# Patient Record
Sex: MALE | Race: WHITE | Employment: UNEMPLOYED | URBAN - METROPOLITAN AREA
[De-identification: names, ages, dates, MRNs, and addresses within clinical notes are randomized per-mention and may not be internally consistent; named-entity substitution may affect disease eponyms.]

---

## 2019-05-01 DIAGNOSIS — Z87.81 STATUS POST FRACTURE OF LEFT HIP: ICD-10-CM

## 2019-05-01 DIAGNOSIS — D62 ACUTE POST-HEMORRHAGIC ANEMIA: ICD-10-CM

## 2019-05-01 DIAGNOSIS — Z91.81 HX OF FALL: ICD-10-CM

## 2019-05-01 DIAGNOSIS — R62.50 DEVELOPMENT DELAY: ICD-10-CM

## 2019-05-01 PROBLEM — G40.909 EPILEPSY (HCC): Status: ACTIVE | Noted: 2019-05-01

## 2019-05-01 PROBLEM — K59.00 CONSTIPATION: Status: ACTIVE | Noted: 2019-05-01

## 2019-05-01 PROBLEM — G80.9 CP (CEREBRAL PALSY) (HCC): Status: ACTIVE | Noted: 2019-05-01

## 2019-05-01 RX ORDER — ASCORBIC ACID 500 MG
500 TABLET ORAL DAILY
COMMUNITY
End: 2020-05-11 | Stop reason: SDUPTHER

## 2019-05-01 RX ORDER — LEVETIRACETAM 250 MG/1
500 TABLET ORAL NIGHTLY
COMMUNITY
End: 2020-05-20

## 2019-05-01 RX ORDER — LEVETIRACETAM 250 MG/1
250 TABLET ORAL EVERY MORNING
COMMUNITY
End: 2020-05-11 | Stop reason: SDUPTHER

## 2019-05-01 RX ORDER — M-VIT,TX,IRON,MINS/CALC/FOLIC 27MG-0.4MG
1 TABLET ORAL DAILY
COMMUNITY
End: 2020-05-11 | Stop reason: SDUPTHER

## 2019-05-02 ENCOUNTER — OFFICE VISIT (OUTPATIENT)
Dept: INTERNAL MEDICINE | Age: 34
End: 2019-05-02
Payer: MEDICARE

## 2019-05-02 VITALS
HEART RATE: 88 BPM | DIASTOLIC BLOOD PRESSURE: 80 MMHG | OXYGEN SATURATION: 97 % | SYSTOLIC BLOOD PRESSURE: 134 MMHG | HEIGHT: 66 IN

## 2019-05-02 DIAGNOSIS — H54.8 LEGALLY BLIND: ICD-10-CM

## 2019-05-02 DIAGNOSIS — Z99.3 WHEELCHAIR BOUND: ICD-10-CM

## 2019-05-02 DIAGNOSIS — Z13.220 SCREENING CHOLESTEROL LEVEL: ICD-10-CM

## 2019-05-02 DIAGNOSIS — G80.9 CEREBRAL PALSY, UNSPECIFIED TYPE (HCC): ICD-10-CM

## 2019-05-02 DIAGNOSIS — L21.9 SEBORRHEA: ICD-10-CM

## 2019-05-02 DIAGNOSIS — G40.001 PARTIAL IDIOPATHIC EPILEPSY WITH SEIZURES OF LOCALIZED ONSET, NOT INTRACTABLE, WITH STATUS EPILEPTICUS (HCC): ICD-10-CM

## 2019-05-02 DIAGNOSIS — D62 ACUTE POST-HEMORRHAGIC ANEMIA: ICD-10-CM

## 2019-05-02 DIAGNOSIS — Z76.89 ENCOUNTER TO ESTABLISH CARE: Primary | ICD-10-CM

## 2019-05-02 DIAGNOSIS — Q21.0 VSD (VENTRICULAR SEPTAL DEFECT): ICD-10-CM

## 2019-05-02 DIAGNOSIS — Z91.81 HX OF FALLING: ICD-10-CM

## 2019-05-02 PROCEDURE — G8421 BMI NOT CALCULATED: HCPCS | Performed by: FAMILY MEDICINE

## 2019-05-02 PROCEDURE — 1036F TOBACCO NON-USER: CPT | Performed by: FAMILY MEDICINE

## 2019-05-02 PROCEDURE — 99204 OFFICE O/P NEW MOD 45 MIN: CPT | Performed by: FAMILY MEDICINE

## 2019-05-02 PROCEDURE — G8427 DOCREV CUR MEDS BY ELIG CLIN: HCPCS | Performed by: FAMILY MEDICINE

## 2019-05-02 RX ORDER — KETOCONAZOLE 20 MG/G
CREAM TOPICAL
Qty: 30 G | Refills: 1 | Status: SHIPPED | OUTPATIENT
Start: 2019-05-02 | End: 2019-12-16 | Stop reason: SDUPTHER

## 2019-05-02 ASSESSMENT — PATIENT HEALTH QUESTIONNAIRE - PHQ9
1. LITTLE INTEREST OR PLEASURE IN DOING THINGS: 0
2. FEELING DOWN, DEPRESSED OR HOPELESS: 0
SUM OF ALL RESPONSES TO PHQ QUESTIONS 1-9: 0
SUM OF ALL RESPONSES TO PHQ QUESTIONS 1-9: 0
SUM OF ALL RESPONSES TO PHQ9 QUESTIONS 1 & 2: 0

## 2019-05-02 NOTE — PROGRESS NOTES
Patient: Dawit Flores    YOB: 1985       Patient Active Problem List    Diagnosis Date Noted    Development delay 05/01/2019     Priority: High    Epilepsy ([facility] [address] 05/01/2019     Priority: High    CP (cerebral palsy) ([facility] 05/01/2019     Priority: High    Hx of falling 05/02/2019     Priority: Medium    VSD (ventricular septal defect) 05/02/2019     Priority: Medium     Overview Note:     S/p surgery for repair      Legally blind 05/02/2019     Priority: Medium    Wheelchair bound 05/02/2019     Priority: Medium    Acute post-hemorrhagic anemia 05/01/2019     Priority: Medium    Constipation 05/01/2019     Priority: Low    Status post fracture of left hip 05/01/2019     Priority: Low     Overview Note:     LT Hip Fracture S/P SHO       Past Medical History:   Diagnosis Date    Acute post-hemorrhagic anemia     Constipation     CP (cerebral palsy) (HCC)     Development delay     Epilepsy (HCC)     Hip fracture (HCC)     LT hip Fracture S/P SHO    Hx MRSA infection     Hx of fall     Legally blind      Past Surgical History:   Procedure Laterality Date    CARDIAC SURGERY      open heart D/t VSD    EYE MUSCLE SURGERY      INGUINAL HERNIA REPAIR       No family history on file.   Social History     Socioeconomic History    Marital status: Single     Spouse name: Not on file    Number of children: Not on file    Years of education: Not on file    Highest education level: Not on file   Occupational History    Not on file   Social Needs    Financial resource strain: Not on file    Food insecurity:     Worry: Not on file     Inability: Not on file    Transportation needs:     Medical: Not on file     Non-medical: Not on file   Tobacco Use    Smoking status: Never Smoker    Smokeless tobacco: Never Used   Substance and Sexual Activity    Alcohol use: Not on file    Drug use: Not on file    Sexual activity: Not on file   Lifestyle    Physical activity:     Days per week: Not drainage. Respiratory: Negative for cough, dyspnea and wheezing. Cardiovascular:  Negative for chest pain, claudication and irregular heartbeat/palpitations. Physical Exam  Vitals:    05/02/19 0714   BP: 134/80   Pulse: 88   SpO2: 97%   There is no height or weight on file to calculate BMI. Physical Exam  Constitutional:  Appears well-developed and well-nourished. No distress. HENT:   Head: Normocephalic and atraumatic. Right Ear: External ear normal.   Left Ear: External ear normal.   Nose: Nose normal.   Eyes: Conjunctivae and EOM are normal.  Right eye exhibits no discharge. Left eye exhibits no discharge. No scleral icterus. Neck: Normal range of motion. Cardiovascular: Normal rate, regular rhythm and normal heart sounds. No murmur heard. Pulmonary/Chest: Effort normal and breath sounds normal. No respiratory distress. no wheezes. no rales. no tenderness. Neurological: alert. Skin: not diaphoretic. Psychiatric: normal mood and affect. behavior is normal.   Mild erythema and superficial flaking of skin on forehead  Nursing note and vitals reviewed. Assessment:  Larissa Diaz was seen today for establish care. Diagnoses and all orders for this visit:    Encounter to establish care    Hx of falling  Currently wheelchair bound  Documented needed assistance with transferring    VSD (ventricular septal defect)  -     Ambulatory referral to Cardiology  -     Echocardiogram complete; Future  No symptoms  Referral to cardiology  Echo ordered    Legally blind  Unchanged    Wheelchair bound  Unchanged    Partial idiopathic epilepsy with seizures of localized onset, not intractable, with status epilepticus ([facility] [address]  -     CBC Auto Differential; Future  -     Comprehensive Metabolic Panel; Future  Stable, controlled  Plan: continue current medications    Cerebral palsy, unspecified type ([address]  -     CBC Auto Differential; Future  -     Comprehensive Metabolic Panel;  Future  Unchanged    Acute post-hemorrhagic anemia  -     CBC Auto Differential; Future  Check levels & need for further work up     Screening cholesterol level  -     Lipid Panel; Future    Seborrhea  -     ketoconazole (NIZORAL) 2 % cream; Apply topically daily. hand out provided, had a lengthy discussion with patient about treatment, it's side effects, the diagnosis & etiology of symptoms, along with management and expectations of outcome with the recommended treatment. Patient verbalized understanding.           Orders Placed This Encounter   Procedures    CBC Auto Differential     Standing Status:   Future     Standing Expiration Date:   7/2/2019    Comprehensive Metabolic Panel     Standing Status:   Future     Standing Expiration Date:   7/2/2019    Lipid Panel     Standing Status:   Future     Standing Expiration Date:   5/2/2020     Order Specific Question:   Is Patient Fasting?/# of Hours     Answer:   fasting    Ambulatory referral to Cardiology     Referral Priority:   Routine     Referral Type:   Eval and Treat     Referral Reason:   Specialty Services Required     Referred to Provider:   Bk Hill DO     Requested Specialty:   Cardiology     Number of Visits Requested:   1    Echocardiogram complete     Standing Status:   Future     Standing Expiration Date:   7/1/2019     Order Specific Question:   Reason for exam:     Answer:   hx of VSD      I personally reviewed all recent labs and imaging pertaining to conditions mentioned above in assessment/plan in [facility] and care everywhere, discussed results with patient in office    Total visit time >45 mins, more than 50% time spent on counseling, treatment, side effects, and follow up    Domo Cabral MD

## 2019-05-28 ENCOUNTER — HOSPITAL ENCOUNTER (OUTPATIENT)
Dept: NON INVASIVE DIAGNOSTICS | Age: 34
Discharge: HOME | End: 2019-05-28
Payer: MEDICARE

## 2019-05-28 DIAGNOSIS — Q21.0 VSD (VENTRICULAR SEPTAL DEFECT): ICD-10-CM

## 2019-05-28 LAB
LV EF: 60 %
LVEF MODALITY: NORMAL

## 2019-05-28 PROCEDURE — 93306 TTE W/DOPPLER COMPLETE: CPT

## 2019-05-31 ENCOUNTER — OFFICE VISIT (OUTPATIENT)
Dept: CARDIOLOGY CLINIC | Age: 34
End: 2019-05-31
Payer: MEDICARE

## 2019-05-31 VITALS — SYSTOLIC BLOOD PRESSURE: 120 MMHG | HEART RATE: 99 BPM | DIASTOLIC BLOOD PRESSURE: 70 MMHG

## 2019-05-31 DIAGNOSIS — Q21.0 VSD (VENTRICULAR SEPTAL DEFECT): ICD-10-CM

## 2019-05-31 DIAGNOSIS — Z00.00 PE (PHYSICAL EXAM), ROUTINE: Primary | ICD-10-CM

## 2019-05-31 PROCEDURE — 99203 OFFICE O/P NEW LOW 30 MIN: CPT | Performed by: INTERNAL MEDICINE

## 2019-05-31 PROCEDURE — 93000 ELECTROCARDIOGRAM COMPLETE: CPT | Performed by: INTERNAL MEDICINE

## 2019-05-31 PROCEDURE — G8421 BMI NOT CALCULATED: HCPCS | Performed by: INTERNAL MEDICINE

## 2019-05-31 PROCEDURE — 1036F TOBACCO NON-USER: CPT | Performed by: INTERNAL MEDICINE

## 2019-05-31 PROCEDURE — G8427 DOCREV CUR MEDS BY ELIG CLIN: HCPCS | Performed by: INTERNAL MEDICINE

## 2019-05-31 NOTE — PROGRESS NOTES
Chief Complaint   Patient presents with   Lolis [facility] Establ[facility] Cardiologist     DR Nury Espinoza    Ventricular Septal Defect       5-31-19: Patient presents for initial medical evaluation. Patient is followed on a regular basis by Dr. Yayo Isabel MD. Patient with hx of cerebral palsy. Hx of VSD s/p repair in the past. Pt denies chest pain, dyspnea, dyspnea on exertion, change in exercise capacity, fatigue,  nausea, vomiting, diarrhea, constipation, motor weakness, insomnia, weight loss, syncope, dizziness, lightheadedness, palpitations, PND, orthopnea. EKG with NSR, IRBBB. S/p ECHO on 5-28-19: with EF of 60%, no VSD, mild PI.            Patient Active Problem List   Diagnosis    Development delay    Epilepsy ([facility] [address]    [facility]    Acute post-hemorrhagic anemia    Constipation    Status post fracture of left hip    Hx of falling    VSD (ventricular septal defect)    Legally blind    Wheelchair bound       Past Surgical History:   Procedure Laterality Date    CARDIAC SURGERY      open heart D/t VSD    EYE MUSCLE SURGERY      INGUINAL HERNIA REPAIR         Social History     Socioeconomic History    Marital status: Single     Spouse name: Not on file    Number of children: Not on file    Years of education: Not on file    Highest education level: Not on file   Occupational History    Not on file   Social Needs    Financial resource strain: Not on file    Food insecurity:     Worry: Not on file     Inability: Not on file    Transportation needs:     Medical: Not on file     Non-medical: Not on file   Tobacco Use    Smoking status: Never Smoker    Smokeless tobacco: Never Used   Substance and Sexual Activity    Alcohol use: Not on file    Drug use: Not on file    Sexual activity: Not on file   Lifestyle    Physical activity:     Days per week: Not on file     Minutes per session: Not on file    Stress: Not on file   Relationships    Social connections:     Talks on phone: Not on file     Gets together: Not on file     Attends [employer] service: Not on file     Active member of club or organization: Not on file     Attends meetings of clubs or organizations: Not on file     Relationship status: Not on file    Intimate partner violence:     Fear of current or ex partner: Not on file     Emotionally abused: Not on file     Physically abused: Not on file     Forced sexual activity: Not on file   Other Topics Concern    Not on file   Social History Narrative    Not on file       No family history on file. Current Outpatient Medications   Medication Sig Dispense Refill    ketoconazole (NIZORAL) 2 % cream Apply topically daily. 30 g 1    levETIRAcetam (KEPPRA) 250 MG tablet Take 250 mg by mouth every morning      levETIRAcetam (KEPPRA) 250 MG tablet Take 500 mg by mouth nightly      vitamin C (ASCORBIC ACID) 500 MG tablet Take 500 mg by mouth daily      Multiple Vitamins-Minerals (THERAPEUTIC MULTIVITAMIN-MINERALS) tablet Take 1 tablet by mouth daily       No current facility-administered medications for this visit. Ciprofloxacin    Review of Systems:  General ROS: negative  Psychological ROS: negative  Hematological and Lymphatic ROS: No history of blood clots or bleeding disorder. Respiratory ROS: no cough, shortness of breath, or wheezing  Cardiovascular ROS: no chest pain or dyspnea on exertion  Gastrointestinal ROS: no abdominal pain, change in bowel habits, or black or bloody stools  Genito-Urinary ROS: no dysuria, trouble voiding, or hematuria  Musculoskeletal ROS: negative  Neurological ROS: no TIA or stroke symptoms  Dermatological ROS: negative    VITALS:  Blood pressure 120/70, pulse 99. There is no height or weight on file to calculate BMI. Physical Examination:  General appearance - alert, well appearing, and in no distress  Mental status - alert, oriented to person, place, and time  Neck - Neck is supple, no JVD or carotid bruits.   No thyromegaly or adenopathy. Chest - clear to auscultation, no wheezes, rales or rhonchi, symmetric air entry  Heart - normal rate, regular rhythm, normal S1, S2, no murmurs, rubs, clicks or gallops  Abdomen - soft, nontender, nondistended, no masses or organomegaly  Neurological - alert, oriented, normal speech, no focal findings or movement disorder noted  Extremities - peripheral pulses normal, no pedal edema, no clubbing or cyanosis  Skin - normal coloration and turgor, no rashes, no suspicious skin lesions noted      EKG: normal sinus rhythm, RBBB    Orders Placed This Encounter   Procedures    EKG 12 Lead       ASSESSMENT:     Diagnosis Orders   1. PE (physical exam), routine  EKG 12 Lead   2. VSD (ventricular septal defect)           PLAN:     Patient will need to continue to follow up with you for their general medical care    As always, aggressive risk factor modification is strongly recommended. We should adhere to the JNC VIII guidelines for HTN management and the NCEP ATP III guidelines for LDL-C management. Cardiac diet is always recommended with low fat, cholesterol, calories and sodium. Continue medications at current doses. Check ECHO in future for VSD monitoring    Check EKG    ABX prior to more advanced/invasive  dental procedures.

## 2019-06-04 RX ORDER — LORATADINE 10 MG/1
10 TABLET ORAL DAILY
Qty: 90 TABLET | Refills: 3 | Status: SHIPPED | OUTPATIENT
Start: 2019-06-04 | End: 2019-06-07 | Stop reason: ALTCHOICE

## 2019-06-06 ENCOUNTER — TELEPHONE (OUTPATIENT)
Dept: INTERNAL MEDICINE | Age: 34
End: 2019-06-06

## 2019-06-06 DIAGNOSIS — J34.9 SINUS PROBLEM: Primary | ICD-10-CM

## 2019-06-06 NOTE — TELEPHONE ENCOUNTER
Lewis Hickman called from [facility] stating that she had a message sent to zeyad while you were out. See below. jamar states mom keeps calling because she knows the only thing tht works is benadryl and she dont understand why we didn't call that in to pharmacy alternatives. Shilo Esquivel does state patient has not had an relief. If we order benadryl they need a d/c for claritin. Please advise     Pato Izquierdo MA           12:14 PM   Note      Indu from [address] [place] [address] called to say that pt has been having a dry cough. His mom came today and let Indu know that pt has problems with allergies, but never got relief from any of the otc allergy meds besides benadryl. Would like to know if rx can be sent to mail order pharmacy.                        12:19 PM   Pato Izquierdo MA routed this conversation to Temo Escalante, [address] [place] [address], [place]           1:04 PM   Note      No pharmacy in chart                      1:05 PM   SHANTAL Tyler routed this conversation to [facility] Clinical Staff       Sushma Mcdaniels MA           6:38 PM   Note      surescript requesting medication refill.  Please approve or deny this request.     Rx requested:  Requested Prescriptions             Pending Prescriptions Disp Refills    loratadine (CLARITIN) 10 MG tablet 90 tablet 3       Sig: Take 1 tablet by mouth daily         Last Office Visit:   5/2/2019     Last Labs:  05/02/19     Next Visit Date:         Future Appointments   Date Time Provider Sara Goyal   6/5/2020  7:15 AM DO SILVIO Shaikh [facility] EM[facility]GENC[facility] AT [email or web address]  Called [facility] and [facility] pharmacy

## 2019-06-07 RX ORDER — DIPHENHYDRAMINE HCL 12.5MG/5ML
LIQUID (ML) ORAL 4 TIMES DAILY PRN
Refills: 4 | Status: CANCELLED | OUTPATIENT
Start: 2019-06-07

## 2019-06-07 NOTE — TELEPHONE ENCOUNTER
PH[facility]Y [facility] requesting medication refill.  Please approve or deny this request.    Rx requested:  Requested Prescriptions     Pending Prescriptions Disp Refills    diphenhydrAMINE (BENADRYL) 12.5 MG/5ML elixir  4     Sig: Take by mouth 4 times daily as needed for Allergies         Last Office Visit:   5/2/2019      Next Visit Date:  Future Appointments   Date Time Provider Sara Goyal   6/5/2020  7:15 AM Santiago Nguyễn, DO [address]     Did not do dosage

## 2019-06-10 ENCOUNTER — TELEPHONE (OUTPATIENT)
Dept: INTERNAL MEDICINE | Age: 34
End: 2019-06-10

## 2019-07-11 ENCOUNTER — HOSPITAL ENCOUNTER (OUTPATIENT)
Dept: NEUROLOGY | Age: 34
Discharge: HOME | End: 2019-07-11
Payer: MEDICARE

## 2019-07-11 PROCEDURE — 95813 EEG EXTND MNTR 61-119 MIN: CPT

## 2019-07-18 ENCOUNTER — HOSPITAL ENCOUNTER (OUTPATIENT)
Dept: MRI IMAGING | Age: 34
Discharge: HOME | End: 2019-07-20
Payer: MEDICARE

## 2019-07-18 DIAGNOSIS — G40.919 INTRACTABLE EPILEPSY WITHOUT STATUS EPILEPTICUS, UNSPECIFIED EPILEPSY TYPE (HCC): ICD-10-CM

## 2019-07-30 ENCOUNTER — TELEPHONE (OUTPATIENT)
Dept: INTERNAL MEDICINE | Age: 34
End: 2019-07-30

## 2019-07-30 NOTE — TELEPHONE ENCOUNTER
Josef Price states that Dr Madhav Antoine recommended that pt have Hep B booster. She would like rx went to [facility]

## 2019-07-30 NOTE — TELEPHONE ENCOUNTER
We can give the vaccination series at the office  Will be either RecombivaxHB or Engerix-B at 0,1, and 6 months  Thank you!     Kaela Lopez MD

## 2019-08-26 RX ORDER — SODIUM PHOSPHATE, DIBASIC AND SODIUM PHOSPHATE, MONOBASIC 7; 19 G/133ML; G/133ML
1 ENEMA RECTAL
COMMUNITY

## 2019-08-26 RX ORDER — IBUPROFEN 200 MG
400 TABLET ORAL EVERY 6 HOURS PRN
COMMUNITY

## 2019-08-26 RX ORDER — ACETAMINOPHEN 325 MG/1
650 TABLET ORAL EVERY 4 HOURS PRN
COMMUNITY

## 2019-08-26 RX ORDER — MAGNESIUM HYDROXIDE/ALUMINUM HYDROXICE/SIMETHICONE 120; 1200; 1200 MG/30ML; MG/30ML; MG/30ML
5 SUSPENSION ORAL EVERY 6 HOURS PRN
COMMUNITY
End: 2020-11-17

## 2019-08-26 RX ORDER — DIPHENHYDRAMINE HCL 25 MG
25 TABLET ORAL EVERY 6 HOURS PRN
COMMUNITY
End: 2020-05-20 | Stop reason: ALTCHOICE

## 2019-08-26 RX ORDER — IBUPROFEN 200 MG
TABLET ORAL 4 TIMES DAILY PRN
COMMUNITY

## 2019-11-14 ENCOUNTER — OFFICE VISIT (OUTPATIENT)
Dept: INTERNAL MEDICINE | Age: 34
End: 2019-11-14
Payer: MEDICARE

## 2019-11-14 VITALS
HEART RATE: 72 BPM | HEIGHT: 66 IN | WEIGHT: 137.8 LBS | DIASTOLIC BLOOD PRESSURE: 72 MMHG | OXYGEN SATURATION: 96 % | SYSTOLIC BLOOD PRESSURE: 128 MMHG | BODY MASS INDEX: 22.14 KG/M2

## 2019-11-14 DIAGNOSIS — R62.50 DEVELOPMENT DELAY: ICD-10-CM

## 2019-11-14 DIAGNOSIS — Z00.00 ROUTINE GENERAL MEDICAL EXAMINATION AT A HEALTH CARE FACILITY: Primary | ICD-10-CM

## 2019-11-14 DIAGNOSIS — Q21.0 VSD (VENTRICULAR SEPTAL DEFECT): ICD-10-CM

## 2019-11-14 DIAGNOSIS — G40.909 NONINTRACTABLE EPILEPSY WITHOUT STATUS EPILEPTICUS, UNSPECIFIED EPILEPSY TYPE (HCC): ICD-10-CM

## 2019-11-14 DIAGNOSIS — G80.9 CEREBRAL PALSY, UNSPECIFIED TYPE (HCC): ICD-10-CM

## 2019-11-14 PROBLEM — H52.03 HYPEROPIA, BILATERAL: Status: ACTIVE | Noted: 2019-05-22

## 2019-11-14 PROCEDURE — G0438 PPPS, INITIAL VISIT: HCPCS | Performed by: FAMILY MEDICINE

## 2019-11-14 PROCEDURE — G8484 FLU IMMUNIZE NO ADMIN: HCPCS | Performed by: FAMILY MEDICINE

## 2019-11-14 ASSESSMENT — PATIENT HEALTH QUESTIONNAIRE - PHQ9
SUM OF ALL RESPONSES TO PHQ QUESTIONS 1-9: 0
SUM OF ALL RESPONSES TO PHQ QUESTIONS 1-9: 0

## 2019-11-14 ASSESSMENT — LIFESTYLE VARIABLES: HOW OFTEN DO YOU HAVE A DRINK CONTAINING ALCOHOL: 0

## 2019-12-16 DIAGNOSIS — L21.9 SEBORRHEA: ICD-10-CM

## 2019-12-16 DIAGNOSIS — F40.9 PHOBIA, UNSPECIFIED TYPE: Primary | ICD-10-CM

## 2019-12-16 RX ORDER — LORAZEPAM 1 MG/1
TABLET ORAL
Qty: 2 TABLET | Refills: 0 | Status: SHIPPED | OUTPATIENT
Start: 2019-12-16 | End: 2020-01-15

## 2019-12-16 RX ORDER — KETOCONAZOLE 20 MG/G
CREAM TOPICAL
Qty: 30 G | Refills: 1 | Status: SHIPPED | OUTPATIENT
Start: 2019-12-16

## 2019-12-31 RX ORDER — GUAIFENESIN AND DEXTROMETHORPHAN HYDROBROMIDE 100; 10 MG/5ML; MG/5ML
5 SOLUTION ORAL EVERY 4 HOURS PRN
COMMUNITY
End: 2020-05-20

## 2020-01-02 ENCOUNTER — TELEPHONE (OUTPATIENT)
Dept: INTERNAL MEDICINE | Age: 35
End: 2020-01-02

## 2020-01-02 ENCOUNTER — OFFICE VISIT (OUTPATIENT)
Dept: INTERNAL MEDICINE | Age: 35
End: 2020-01-02
Payer: MEDICARE

## 2020-01-02 VITALS
TEMPERATURE: 100 F | BODY MASS INDEX: 22.24 KG/M2 | DIASTOLIC BLOOD PRESSURE: 68 MMHG | SYSTOLIC BLOOD PRESSURE: 122 MMHG | HEIGHT: 66 IN | OXYGEN SATURATION: 96 % | HEART RATE: 102 BPM

## 2020-01-02 LAB
INFLUENZA A ANTIBODY: NORMAL
INFLUENZA B ANTIBODY: NORMAL

## 2020-01-02 PROCEDURE — 99213 OFFICE O/P EST LOW 20 MIN: CPT | Performed by: PHYSICIAN ASSISTANT

## 2020-01-02 PROCEDURE — 87804 INFLUENZA ASSAY W/OPTIC: CPT | Performed by: PHYSICIAN ASSISTANT

## 2020-01-02 PROCEDURE — G8484 FLU IMMUNIZE NO ADMIN: HCPCS | Performed by: PHYSICIAN ASSISTANT

## 2020-01-02 PROCEDURE — G8427 DOCREV CUR MEDS BY ELIG CLIN: HCPCS | Performed by: PHYSICIAN ASSISTANT

## 2020-01-02 PROCEDURE — G8420 CALC BMI NORM PARAMETERS: HCPCS | Performed by: PHYSICIAN ASSISTANT

## 2020-01-02 PROCEDURE — 1036F TOBACCO NON-USER: CPT | Performed by: PHYSICIAN ASSISTANT

## 2020-01-02 RX ORDER — AZITHROMYCIN 250 MG/1
TABLET, FILM COATED ORAL
Qty: 6 TABLET | Refills: 0 | Status: SHIPPED | OUTPATIENT
Start: 2020-01-02 | End: 2020-05-20 | Stop reason: ALTCHOICE

## 2020-01-02 ASSESSMENT — PATIENT HEALTH QUESTIONNAIRE - PHQ9: DEPRESSION UNABLE TO ASSESS: FUNCTIONAL CAPACITY MOTIVATION LIMITS ACCURACY

## 2020-01-02 NOTE — PROGRESS NOTES
2020     Favian Elizabeth (:  1985) is a 37 y.o. male, here for evaluation of the following medical concerns:      Chief Complaint   Patient presents with    Fever     102.2  mg @ 3:40 pm    Cough    Wheezing     slight     Nasal Congestion         HPI      [facility] resident, coming in with his roommate  Both are sick with the same symptoms   Flulike symptoms fever, up to 102, productive cough, and slight wheeze per the caregiver  Also nasal congestion  Last dose of ibuprofen was at 340 today      Review of Systems   Constitutional: Positive for fever. Negative for appetite change and chills. HENT: Positive for congestion and rhinorrhea. Negative for sore throat. Respiratory: Positive for cough and wheezing. Negative for chest tightness. ROS per caregiver and patient     Prior to Visit Medications    Medication Sig Taking? Authorizing Provider   azithromycin (ZITHROMAX) 250 MG tablet 500mg on day 1 followed by 250mg on days 2 - 5 Yes SHANTAL Jones   Dextromethorphan-guaiFENesin  MG/5ML SYRP Take 5 mLs by mouth every 4 hours as needed for Cough (and congestion) Yes Historical Provider, MD   ketoconazole (NIZORAL) 2 % cream Apply topically daily. Yes Heidy Marshall MD   LORazepam (ATIVAN) 1 MG tablet Give 1 tablet 1 hour prior to procedure and may repeat 30 mins later if needed. Yes Heidy Marshall MD   acetaminophen (TYLENOL) 325 MG tablet Take 650 mg by mouth every 6 hours as needed for Pain Yes Historical Provider, MD   OXYGEN Inhale 2 L/min into the lungs Yes Historical Provider, MD   Sodium Phosphates (FLEET) 7-19 GM/118ML Place 1 enema rectally once as needed Yes Historical Provider, MD   neomycin-bacitracin-polymyxin (NEOSPORIN) 5-400-5000 ointment Apply topically 4 times daily as needed Apply topically 4 times daily.  Yes Historical Provider, MD   [address] topically as needed Yes Historical Provider, MD   Carbamide Peroxide (DEBROX OT) Place in ear(s)

## 2020-01-02 NOTE — TELEPHONE ENCOUNTER
Carina Code called stating that the patient has had a low grade fever, cough, wheezing, spo2 of 94 pulse of 110 and 142/89 BP which BP is about normal for him. States his mom just had pneumonia and is wondering if her has it and if a chest x-ray can be ordered.     Please advise

## 2020-01-07 ASSESSMENT — ENCOUNTER SYMPTOMS
CHEST TIGHTNESS: 0
WHEEZING: 1
RHINORRHEA: 1
COUGH: 1
SORE THROAT: 0

## 2020-02-13 RX ORDER — CHOLECALCIFEROL (VITAMIN D3) 125 MCG
1 CAPSULE ORAL DAILY
COMMUNITY

## 2020-05-11 RX ORDER — M-VIT,TX,IRON,MINS/CALC/FOLIC 27MG-0.4MG
1 TABLET ORAL DAILY
Qty: 90 TABLET | Refills: 1 | Status: SHIPPED | OUTPATIENT
Start: 2020-05-11

## 2020-05-11 RX ORDER — LEVETIRACETAM 500 MG/1
500 TABLET ORAL NIGHTLY
Qty: 90 TABLET | Refills: 1 | Status: SHIPPED | OUTPATIENT
Start: 2020-05-11

## 2020-05-11 RX ORDER — LEVETIRACETAM 250 MG/1
250 TABLET ORAL EVERY MORNING
Qty: 90 TABLET | Refills: 1 | Status: SHIPPED | OUTPATIENT
Start: 2020-05-11

## 2020-05-11 RX ORDER — ASCORBIC ACID 500 MG
500 TABLET ORAL DAILY
Qty: 90 TABLET | Refills: 1 | Status: SHIPPED | OUTPATIENT
Start: 2020-05-11

## 2020-05-20 ENCOUNTER — VIRTUAL VISIT (OUTPATIENT)
Dept: INTERNAL MEDICINE | Age: 35
End: 2020-05-20
Payer: MEDICARE

## 2020-05-20 VITALS
HEART RATE: 87 BPM | RESPIRATION RATE: 15 BRPM | DIASTOLIC BLOOD PRESSURE: 88 MMHG | SYSTOLIC BLOOD PRESSURE: 128 MMHG | TEMPERATURE: 97.8 F | OXYGEN SATURATION: 97 %

## 2020-05-20 PROCEDURE — 99213 OFFICE O/P EST LOW 20 MIN: CPT | Performed by: FAMILY MEDICINE

## 2020-05-20 PROCEDURE — G8427 DOCREV CUR MEDS BY ELIG CLIN: HCPCS | Performed by: FAMILY MEDICINE

## 2020-05-20 RX ORDER — GUAIFENESIN 100 MG/5ML
200 SYRUP ORAL EVERY 4 HOURS PRN
COMMUNITY

## 2020-05-21 ENCOUNTER — TELEPHONE (OUTPATIENT)
Dept: INTERNAL MEDICINE | Age: 35
End: 2020-05-21

## 2020-05-21 NOTE — TELEPHONE ENCOUNTER
Joel Class called the pt's guardian stated that the pt has never had the chicken pox. Can an order be sent to [facility]?

## 2020-06-02 ENCOUNTER — VIRTUAL VISIT (OUTPATIENT)
Dept: CARDIOLOGY CLINIC | Age: 35
End: 2020-06-02
Payer: MEDICARE

## 2020-06-02 PROCEDURE — G8427 DOCREV CUR MEDS BY ELIG CLIN: HCPCS | Performed by: INTERNAL MEDICINE

## 2020-06-02 PROCEDURE — 99213 OFFICE O/P EST LOW 20 MIN: CPT | Performed by: INTERNAL MEDICINE

## 2020-06-02 PROCEDURE — 1036F TOBACCO NON-USER: CPT | Performed by: INTERNAL MEDICINE

## 2020-06-02 PROCEDURE — G8420 CALC BMI NORM PARAMETERS: HCPCS | Performed by: INTERNAL MEDICINE

## 2020-06-02 ASSESSMENT — ENCOUNTER SYMPTOMS
SHORTNESS OF BREATH: 0
NAUSEA: 0
VOMITING: 0
ABDOMINAL PAIN: 0
EYES NEGATIVE: 1
WHEEZING: 0
GASTROINTESTINAL NEGATIVE: 1

## 2020-06-02 NOTE — PROGRESS NOTES
MG/5ML syrup Take 200 mg by mouth 3 times daily as needed for Cough Yes Historical Provider, MD   Incontinent Wash (PERINEAL CLEANSING EX) Apply topically Yes Historical Provider, MD   levETIRAcetam (KEPPRA) 250 MG tablet Take 1 tablet by mouth every morning Yes Nunu Vargas MD   vitamin C (ASCORBIC ACID) 500 MG tablet Take 1 tablet by mouth daily Yes Nunu Vargas MD   Multiple Vitamins-Minerals (THERAPEUTIC MULTIVITAMIN-MINERALS) tablet Take 1 tablet by mouth daily Yes Nunu Vargas MD   levETIRAcetam (KEPPRA) 500 MG tablet Take 1 tablet by mouth nightly Yes Nnuu Vargas MD   Cholecalciferol (VITAMIN D3) 50 MCG (2000 UT) TABS Take 1 tablet by mouth daily Yes Historical Provider, MD   ketoconazole (NIZORAL) 2 % cream Apply topically daily. Yes Nunu Vargas MD   acetaminophen (TYLENOL) 325 MG tablet Take 650 mg by mouth every 6 hours as needed for Pain Yes Historical Provider, MD   OXYGEN Inhale 2 L/min into the lungs Yes Historical Provider, MD   Sodium Phosphates (FLEET) 7-19 GM/118ML Place 1 enema rectally once as needed Yes Historical Provider, MD   neomycin-bacitracin-polymyxin (NEOSPORIN) 5-400-5000 ointment Apply topically 4 times daily as needed Apply topically 4 times daily.  Yes Historical Provider, MD   ZINC OXIDE EX Apply topically as needed Yes Historical Provider, MD   Carbamide Peroxide (DEBROX OT) Place in ear(s) Yes Historical Provider, MD   ibuprofen (ADVIL;MOTRIN) 200 MG tablet Take 400 mg by mouth every 6 hours as needed for Pain  Yes Historical Provider, MD   aluminum & magnesium hydroxide-simethicone (MYLANTA) 200-200-20 MG/5ML SUSP suspension Take 5 mLs by mouth every 6 hours as needed for Indigestion Yes Historical Provider, MD   Magnesium Hydroxide (MILK OF MAGNESIA PO) Take 30 mLs by mouth as needed (constipation)  Yes Historical Provider, MD   Probiotic Product (PROBIOTIC DAILY PO) Take by mouth 2 times daily as needed (when taking Abx)  Yes Historical Provider, MD       Social management.     Cardiac diet is always recommended with low fat, cholesterol, calories and sodium.     Continue medications at current doses.      Check ECHO in future for VSD monitoring     Check EKG next OV     ABX prior to more advanced/invasive dental procedures. Return in about 1 year (around 6/2/2021). An  electronic signature was used to authenticate this note. --Stephie Angeles,  on 6/2/2020 at 9:33 AM  9}    Pursuant to the emergency declaration under the [facility] waiver authority and the [employer] Act, this Virtual  Visit was conducted, with patient's consent, to reduce the patient's risk of exposure to COVID-19 and provide continuity of care for an established patient. Services were provided through a video synchronous discussion virtually to substitute for in-person clinic visit. Total Virtual Visit time spent: 11 min. Please note this report has been partially produced using speech recognition software and may cause contain errors related to that system including grammar, punctuation and spelling as well as words and phrases that may seem inappropriate. If there are questions or concerns please feel free to contact me to clarify.

## 2020-06-10 ENCOUNTER — NURSE ONLY (OUTPATIENT)
Dept: INTERNAL MEDICINE | Age: 35
End: 2020-06-10
Payer: MEDICARE

## 2020-06-10 PROCEDURE — 90471 IMMUNIZATION ADMIN: CPT | Performed by: FAMILY MEDICINE

## 2020-06-10 PROCEDURE — 90716 VAR VACCINE LIVE SUBQ: CPT | Performed by: FAMILY MEDICINE

## 2020-08-20 LAB
AVERAGE GLUCOSE: ABNORMAL
HBA1C MFR BLD: 14.1 %
HCT VFR BLD CALC: 43.1 % (ref 41–53)

## 2020-09-18 RX ORDER — DIPHENHYDRAMINE HCL 25 MG
25 CAPSULE ORAL NIGHTLY
COMMUNITY
End: 2021-03-12

## 2020-11-17 ENCOUNTER — VIRTUAL VISIT (OUTPATIENT)
Dept: INTERNAL MEDICINE | Age: 35
End: 2020-11-17
Payer: MEDICARE

## 2020-11-17 PROCEDURE — G8427 DOCREV CUR MEDS BY ELIG CLIN: HCPCS | Performed by: FAMILY MEDICINE

## 2020-11-17 PROCEDURE — 99213 OFFICE O/P EST LOW 20 MIN: CPT | Performed by: FAMILY MEDICINE

## 2020-11-17 ASSESSMENT — PATIENT HEALTH QUESTIONNAIRE - PHQ9
SUM OF ALL RESPONSES TO PHQ QUESTIONS 1-9: 0
1. LITTLE INTEREST OR PLEASURE IN DOING THINGS: 0
2. FEELING DOWN, DEPRESSED OR HOPELESS: 0
SUM OF ALL RESPONSES TO PHQ9 QUESTIONS 1 & 2: 0
SUM OF ALL RESPONSES TO PHQ QUESTIONS 1-9: 0
SUM OF ALL RESPONSES TO PHQ QUESTIONS 1-9: 0

## 2020-11-17 ASSESSMENT — LIFESTYLE VARIABLES: HOW OFTEN DO YOU HAVE A DRINK CONTAINING ALCOHOL: 0

## 2020-11-17 NOTE — PROGRESS NOTES
Patient: Freddy Cisse    YOB: 1985    Date: 11/17/20       Patient Active Problem List    Diagnosis Date Noted    Hyperopia, bilateral 05/22/2019    Hx of falling 05/02/2019    VSD (ventricular septal defect) 05/02/2019     Overview Note:     S/p surgery for repair, seen by cards , yearly f/u      Legally blind 05/02/2019    Wheelchair bound 05/02/2019    Development delay 05/01/2019    Epilepsy ([facility] 05/01/2019     Overview Note:           CP (cerebral palsy) ([facility] 05/01/2019    Acute post-hemorrhagic anemia 05/01/2019    Constipation 05/01/2019    Status post fracture of left hip 05/01/2019     Overview Note:     LT Hip Fracture S/P SHO       Past Medical History:   Diagnosis Date    Acute post-hemorrhagic anemia     Constipation     CP (cerebral palsy) (HCC)     Development delay     Epilepsy (HCC)     Hip fracture (HCC)     LT hip Fracture S/P SHO    Hx MRSA infection     Hx of fall     Legally blind      Past Surgical History:   Procedure Laterality Date    CARDIAC SURGERY      open heart D/t VSD    EYE MUSCLE SURGERY      INGUINAL HERNIA REPAIR       Family History   Family history unknown: Yes     Social History     Socioeconomic History    Marital status: Single     Spouse name: Not on file    Number of children: Not on file    Years of education: Not on file    Highest education level: Not on file   Occupational History    Not on file   Social Needs    Financial resource strain: Not on file    Food insecurity     Worry: Not on file     Inability: Not on file    Transportation needs     Medical: Not on file     Non-medical: Not on file   Tobacco Use    Smoking status: Never Smoker    Smokeless tobacco: Never Used   Substance and Sexual Activity    Alcohol use: Not on file    Drug use: Not on file    Sexual activity: Not on file   Lifestyle    Physical activity     Days per week: Not on file     Minutes per session: Not on file    Stress: Not on file   Relationships    Social connections     Talks on phone: Not on file     Gets together: Not on file     Attends Yarsani service: Not on file     Active member of club or organization: Not on file     Attends meetings of clubs or organizations: Not on file     Relationship status: Not on file    Intimate partner violence     Fear of current or ex partner: Not on file     Emotionally abused: Not on file     Physically abused: Not on file     Forced sexual activity: Not on file   Other Topics Concern    Not on file   Social History Narrative    Not on file     Current Outpatient Medications on File Prior to Visit   Medication Sig Dispense Refill    diphenhydrAMINE (BENADRYL) 25 MG capsule Take 25 mg by mouth nightly      guaiFENesin (ROBAFEN) 100 MG/5ML syrup Take 200 mg by mouth 3 times daily as needed for Cough      Incontinent Wash (PERINEAL CLEANSING EX) Apply topically      levETIRAcetam (KEPPRA) 250 MG tablet Take 1 tablet by mouth every morning 90 tablet 1    vitamin C (ASCORBIC ACID) 500 MG tablet Take 1 tablet by mouth daily 90 tablet 1    Multiple Vitamins-Minerals (THERAPEUTIC MULTIVITAMIN-MINERALS) tablet Take 1 tablet by mouth daily 90 tablet 1    levETIRAcetam (KEPPRA) 500 MG tablet Take 1 tablet by mouth nightly 90 tablet 1    Cholecalciferol (VITAMIN D3) 50 MCG (2000 UT) TABS Take 1 tablet by mouth daily      ketoconazole (NIZORAL) 2 % cream Apply topically daily. 30 g 1    acetaminophen (TYLENOL) 325 MG tablet Take 650 mg by mouth every 6 hours as needed for Pain      OXYGEN Inhale 2 L/min into the lungs      Sodium Phosphates (FLEET) 7-19 GM/118ML Place 1 enema rectally once as needed      neomycin-bacitracin-polymyxin (NEOSPORIN) 5-400-5000 ointment Apply topically 4 times daily as needed Apply topically 4 times daily.       ZINC OXIDE EX Apply topically as needed      Carbamide Peroxide (DEBROX OT) Place in ear(s)      ibuprofen (ADVIL;MOTRIN) 200 MG tablet Take 400 mg by mouth every 6 hours as needed for Pain       aluminum & magnesium hydroxide-simethicone (MYLANTA) 200-200-20 MG/5ML SUSP suspension Take 5 mLs by mouth every 6 hours as needed for Indigestion      Magnesium Hydroxide (MILK OF MAGNESIA PO) Take 30 mLs by mouth as needed (constipation)       Probiotic Product (PROBIOTIC DAILY PO) Take by mouth 2 times daily as needed (when taking Abx)        No current facility-administered medications on file prior to visit. Allergies   Allergen Reactions    Ciprofloxacin        Chief Complaint   Patient presents with    Follow-up     TELEHEALTH EVALUATION -- Audio/Visual (During PTCDK-40 public health emergency)    Due to COVID 19 outbreak, patient's office visit was converted to a virtual visit. Patient was contacted and agreed to proceed with a virtual visit via [email or web address] me  The risks and benefits of converting to a virtual visit were discussed in light of the current infectious disease epidemic. Patient also understood that insurance coverage and co-pays are up to their individual insurance plans. Pursuant to the emergency declaration under the [facility], [facility] waiver authority and the [employer] Act, this Virtual  Visit was conducted, with patient's consent, to reduce the patient's risk of exposure to COVID-19 and provide continuity of care for an established patient. Services were provided through a video discussion virtually to substitute for in-person clinic visit. HPI    6 month routine follow-up    patient is verbal, has no concerns today.  He is Wheelchair bound     He resides in a group home and is seen today with his caregiver who also has no concerns for pt.     Problem list, PMHx, SHx, FHx, Medications: all reviewed and updated in [facility]  preventative care all discussed and reviewed today     Patient has hx of VSD s/p repair  He currently has no SOB, CP, LE edema  Echo done and referred to cardiology  S/p ECHO on 5-28-19: with EF of 60%, no VSD, mild PI.   EKG: normal sinus rhythm, RBBB  Cardiology recommended - ABX prior to more advanced/invasive  dental procedures. Seeing cardiology yearly    Seborrhea - started on topical antifungal last visit, all cleared up now    Epilepsy  Seeing neurology  No recent seizures    Labs from 5/14/2020  Low HDL at 33    H/H 12.9/40.8 from 14.3/43 last year      Review of Systems  Constitutional: Negative for fatigue, fever and sweats. HEENT: Negative for eye discharge and vision loss. Negative for ear drainage, hearing loss and nasal drainage. Respiratory: Negative for cough, dyspnea and wheezing. Cardiovascular:  Negative for chest pain, claudication and irregular heartbeat/palpitations. Physical Exam  Nursing note reviewed. Constitutional:       General: no acute distress. Appearance: Normal appearance. normal weight. not ill-appearing, toxic-appearing or diaphoretic. HENT:      Head: Normocephalic and atraumatic. Right Ear: External ear normal.      Left Ear: External ear normal.      Nose: Nose normal.   Eyes:      General: No scleral icterus. Right eye: No discharge. Left eye: No discharge. Extraocular Movements: Extraocular movements intact. Conjunctiva/sclera: Conjunctivae normal.   Neck:      Musculoskeletal: on wheelchair  Pulmonary:      Effort: Pulmonary effort is normal.   Neurological:      Mental Status: alert. Mental status is at baseline. Psychiatric:         Attention and Perception: Attention and perception normal.         Mood and Affect: Mood and affect normal.         Speech: Speech normal.         Behavior: Behavior normal. Behavior is cooperative. Due to this being a TeleHealth encounter, evaluation of the following organ systems is limited: Vitals/Constitutional/EENT/Resp/CV/GI//MS/Neuro/Skin/Heme-Lymph-Imm.       Assessment/PLAN:  Mechelle Harris was seen today for follow-up. Diagnoses and all orders for this visit:    Development delay  Cerebral palsy, unspecified type ([facility]  Wheelchair bound  Unchanged    Nonintractable epilepsy without status epilepticus, unspecified epilepsy type ([facility]  Stable, controlled  Plan: continue current medications  Cont f/u neuro    VSD (ventricular septal defect)  Rev echo  No current symptoms  EKG - RBBB  Cont yearly f/u cardiology    Varicella 6/10/2020    Last lipid 5/7/20  TC: 189  HDL 33        Return in about 6 months (around 5/17/2021) for routine 6 month f/u. Patient is aware this encounter is billable to insurance. This encounter occurred with patient at home while provider was at the office.

## 2021-02-01 ENCOUNTER — VIRTUAL VISIT (OUTPATIENT)
Dept: INTERNAL MEDICINE | Age: 36
End: 2021-02-01
Payer: MEDICARE

## 2021-02-01 DIAGNOSIS — R19.7 DIARRHEA, UNSPECIFIED TYPE: ICD-10-CM

## 2021-02-01 DIAGNOSIS — R09.81 NASAL CONGESTION: ICD-10-CM

## 2021-02-01 DIAGNOSIS — R05.9 COUGH: ICD-10-CM

## 2021-02-01 DIAGNOSIS — R05.9 COUGH: Primary | ICD-10-CM

## 2021-02-01 PROCEDURE — 99213 OFFICE O/P EST LOW 20 MIN: CPT | Performed by: FAMILY MEDICINE

## 2021-02-01 PROCEDURE — G8427 DOCREV CUR MEDS BY ELIG CLIN: HCPCS | Performed by: FAMILY MEDICINE

## 2021-02-01 RX ORDER — DIPHENHYDRAMINE HCL 25 MG
25 CAPSULE ORAL EVERY 8 HOURS PRN
COMMUNITY

## 2021-02-01 SDOH — ECONOMIC STABILITY: TRANSPORTATION INSECURITY
IN THE PAST 12 MONTHS, HAS THE LACK OF TRANSPORTATION KEPT YOU FROM MEDICAL APPOINTMENTS OR FROM GETTING MEDICATIONS?: NOT ASKED

## 2021-02-01 SDOH — ECONOMIC STABILITY: FOOD INSECURITY: WITHIN THE PAST 12 MONTHS, THE FOOD YOU BOUGHT JUST DIDN'T LAST AND YOU DIDN'T HAVE MONEY TO GET MORE.: NEVER TRUE

## 2021-02-01 ASSESSMENT — ENCOUNTER SYMPTOMS: COUGH: 1

## 2021-02-01 ASSESSMENT — PATIENT HEALTH QUESTIONNAIRE - PHQ9: DEPRESSION UNABLE TO ASSESS: FUNCTIONAL CAPACITY MOTIVATION LIMITS ACCURACY

## 2021-02-01 NOTE — PROGRESS NOTES
Patient: Tonya Barton    YOB: 1985    Date: 2/1/21       Patient Active Problem List    Diagnosis Date Noted    Hyperopia, bilateral 05/22/2019    Hx of falling 05/02/2019    VSD (ventricular septal defect) 05/02/2019     Overview Note:     S/p surgery for repair, seen by cards , yearly f/u      Legally blind 05/02/2019    Wheelchair bound 05/02/2019    Development delay 05/01/2019    Epilepsy ([facility] 05/01/2019     Overview Note:           CP (cerebral palsy) ([facility] 05/01/2019    Acute post-hemorrhagic anemia 05/01/2019    Constipation 05/01/2019    Status post fracture of left hip 05/01/2019     Overview Note:     LT Hip Fracture S/P SHO       Past Medical History:   Diagnosis Date    Acute post-hemorrhagic anemia     Constipation     CP (cerebral palsy) (HCC)     Development delay     Epilepsy (HCC)     Hip fracture (HCC)     LT hip Fracture S/P SHO    Hx MRSA infection     Hx of fall     Legally blind      Past Surgical History:   Procedure Laterality Date    CARDIAC SURGERY      open heart D/t VSD    EYE MUSCLE SURGERY      INGUINAL HERNIA REPAIR       Family History   Family history unknown: Yes     Social History     Socioeconomic History    Marital status: Single     Spouse name: Not on file    Number of children: Not on file    Years of education: Not on file    Highest education level: Not on file   Occupational History    Not on file   Social Needs    Financial resource strain: Not hard at all   [place] insecurity     Worry: Never true     Inability: Never true    Transportation needs     Medical: Not on file     Non-medical: Not on file   Tobacco Use    Smoking status: Never Smoker    Smokeless tobacco: Never Used   Substance and Sexual Activity    Alcohol use: Not on file    Drug use: Not on file    Sexual activity: Not on file   Lifestyle    Physical activity     Days per week: Not on file     Minutes per session: Not on file  Stress: Not on file   Relationships    Social connections     Talks on phone: Not on file     Gets together: Not on file     Attends [employer] service: Not on file     Active member of club or organization: Not on file     Attends meetings of clubs or organizations: Not on file     Relationship status: Not on file    Intimate partner violence     Fear of current or ex partner: Not on file     Emotionally abused: Not on file     Physically abused: Not on file     Forced sexual activity: Not on file   Other Topics Concern    Not on file   Social History Narrative    Not on file     Current Outpatient Medications on File Prior to Visit   Medication Sig Dispense Refill    Calcium Carbonate-Simethicone (MAALOX MAX PO) Take by mouth Two tablespoons every 4 hours prn for indigestion.  diphenhydrAMINE (BENADRYL) 25 MG capsule Take 25 mg by mouth nightly      guaiFENesin (ROBAFEN) 100 MG/5ML syrup Take 200 mg by mouth 3 times daily as needed for Cough      Incontinent Wash (PERINEAL CLEANSING EX) Apply topically      levETIRAcetam (KEPPRA) 250 MG tablet Take 1 tablet by mouth every morning 90 tablet 1    vitamin C (ASCORBIC ACID) 500 MG tablet Take 1 tablet by mouth daily 90 tablet 1    Multiple Vitamins-Minerals (THERAPEUTIC MULTIVITAMIN-MINERALS) tablet Take 1 tablet by mouth daily 90 tablet 1    levETIRAcetam (KEPPRA) 500 MG tablet Take 1 tablet by mouth nightly 90 tablet 1    Cholecalciferol (VITAMIN D3) 50 MCG (2000 UT) TABS Take 1 tablet by mouth daily      ketoconazole (NIZORAL) 2 % cream Apply topically daily. 30 g 1    acetaminophen (TYLENOL) 325 MG tablet Take 650 mg by mouth every 6 hours as needed for Pain      OXYGEN Inhale 2 L/min into the lungs      Sodium Phosphates (FLEET) 7-19 GM/118ML Place 1 enema rectally once as needed      neomycin-bacitracin-polymyxin (NEOSPORIN) 5-400-5000 ointment Apply topically 4 times daily as needed Apply topically 4 times daily.  ZINC OXIDE EX Apply topically as needed      Carbamide Peroxide (DEBROX OT) Place in ear(s)      ibuprofen (ADVIL;MOTRIN) 200 MG tablet Take 400 mg by mouth every 6 hours as needed for Pain       Magnesium Hydroxide (MILK OF MAGNESIA PO) Take 30 mLs by mouth as needed (constipation)       Probiotic Product (PROBIOTIC DAILY PO) Take by mouth 2 times daily as needed (when taking Abx)        No current facility-administered medications on file prior to visit. Allergies   Allergen Reactions    Ciprofloxacin        Chief Complaint   Patient presents with    Concern For COVID-19     nasal congestion, cough. TELEHEALTH EVALUATION -- Audio/Visual (During GRRLO-94 public health emergency)    Due to COVID 19 outbreak, patient's office visit was converted to a virtual visit. Patient was contacted and agreed to proceed with a virtual visit via Doxy. me  The risks and benefits of converting to a virtual visit were discussed in light of the current infectious disease epidemic. Patient also understood that insurance coverage and co-pays are up to their individual insurance plans. Pursuant to the emergency declaration under the [facility], [facility] waiver authority and the [employer] and [employer] Act, this Virtual  Visit was conducted, with patient's consent, to reduce the patient's risk of exposure to COVID-19 and provide continuity of care for an established patient. Services were provided through a video discussion virtually to substitute for in-person clinic visit.     HPI    Hx provided by caregiver Indu & patient  Patient is verbal    Symptoms:nasal congestion, cough, diarrhea, belly upset  Location: mid belly  Quality:upset  Severity:mild  Duration:1/25/21  Timing:one episode of loose stool  Context:Res care patient, they have had + covid cases  Alleviating/aggravting factors:benadryl helps  Associated signs & symptoms: Fever or chills - no  Cough - yes  Shortness of breath or difficulty breathing - no  Fatigue - no  Muscle or body aches - no  Headache - no  New loss of taste or smell - no  Sore throat - no  Congestion or runny nose - yes  Nausea or vomiting - yes  Diarrhea - yes one episode       Review of Systems   HENT: Positive for congestion. Respiratory: Positive for cough. Cardiovascular: Negative. Genitourinary: Negative. Physical Exam    Nursing note reviewed. Constitutional:       General: no acute distress. Appearance: Normal appearance. overweight. not ill-appearing, toxic-appearing or diaphoretic. HENT:      Head: Normocephalic and atraumatic. Right Ear: External ear normal.      Left Ear: External ear normal.      Nose: Nose normal.   Eyes:      General: No scleral icterus. Right eye: No discharge. Left eye: No discharge. Extraocular Movements: Extraocular movements intact. Conjunctiva/sclera: Conjunctivae normal.     Mouth: moist  No pain with palpation of ears  Pain with swallowing  No LAD  Neck:      Musculoskeletal: Normal range of motion. Pulmonary:      Effort: Pulmonary effort is normal.   Musculoskeletal: Normal range of motion. Due to this being a TeleHealth encounter, evaluation of the following organ systems is limited: Vitals/Constitutional/EENT/Resp/CV/GI//MS/Neuro/Skin/Heme-Lymph-Imm. Assessment/Plan:  Precious Ji was seen today for concern for covid-19. Diagnoses and all orders for this visit:    Cough  -     Covid-19 Ambulatory; Future  Diarrhea, unspecified type  -     Covid-19 Ambulatory; Future  Nasal congestion  -     Covid-19 Ambulatory; Future  R/o covid  Symptomatic care discussed  + covid exposure  Symptoms worsen ->ER    Steps to help prevent the spread of COVID-19 if you are sick  SOURCE - https://mansoor-ramone.info/. html     Stay home except to get medical care ; Stay home: People who are mildly ill with COVID-19 are able to isolate at home during their illness. You should restrict activities outside your home, except for getting medical care.   ; Avoid public areas: Do not go to work, school, or public areas.   ; Avoid public transportation: Avoid using public transportation, ride-sharing, or taxis.  ; Separate yourself from other people and animals in your home   ; Stay away from others: As much as possible, you should stay in a specific room and away from other people in your home. Also, you should use a separate bathroom, if available.   ; Limit contact with pets & animals: You should restrict contact with pets and other animals while you are sick with COVID-19, just like you would around other people. Although there have not been reports of pets or other animals becoming sick with COVID-19, it is still recommended that people sick with COVID-19 limit contact with animals until more information is known about the virus. ; When possible, have another member of your household care for your animals while you are sick. If you are sick with COVID-19, avoid contact with your pet, including petting, snuggling, being kissed or licked, and sharing food. If you must care for your pet or be around animals while you are sick, wash your hands before and after you interact with pets and wear a facemask. See COVID-19 and Animals for more information. Other considerations  ? The ill person should eat/be fed in their room if possible. Non-disposable  items used should be handled with gloves and washed with hot water or in a . Clean hands after handling used  items. ? If possible, dedicate a lined trash can for the ill person. Use gloves when removing garbage bags, handling, and disposing of trash. Wash hands after handling or disposing of trash. ? Consider consulting with your local health department about trash disposal guidance if available. Information for Household Members and Caregivers of Someone who is Sick   Call ahead before visiting your doctor   Call ahead: If you have a medical appointment, call the healthcare provider and tell them that you have or may have COVID-19. This will help the healthcare provider's office take steps to keep other people from getting infected or exposed. Wear a facemask if you are sick   ; If you are sick: You should wear a facemask when you are around other people (e.g., sharing a room or vehicle) or pets and before you enter a healthcare provider's office. ; If you are caring for others: If the person who is sick is not able to wear a facemask (for example, because it causes trouble breathing), then people who live with the person who is sick should not stay in the same room with them, or they should wear a facemask if they enter a room with the person who is sick. Cover your coughs and sneezes   ; Cover: Cover your mouth and nose with a tissue when you cough or sneeze.   ; Dispose: Throw used tissues in a lined trash can.   ; Wash hands: Immediately wash your hands with soap and water for at least 20 seconds or, if soap and water are not available, clean your hands with an alcohol-based hand  that contains at least 60% alcohol. Clean your hands often   ; Wash hands: Wash your hands often with soap and water for at least 20 seconds, especially after blowing your nose, coughing, or sneezing; going to the bathroom; and before eating or preparing food.   ; Hand : If soap and water are not readily available, use an alcohol-based hand  with at least 60% alcohol, covering all surfaces of your hands and rubbing them together until they feel dry.   ; Soap and water: Soap and water are the best option if hands are visibly dirty. ; Avoid touching: Avoid touching your eyes, nose, and mouth with unwashed hands. Handwashing Tips   ; Wet your hands with clean, running water (warm or cold), turn off the tap, and apply soap.  ; Lather your hands by rubbing them together with the soap. Lather the backs of your hands, between your fingers, and under your nails. ; Scrub your hands for at least 20 seconds. Need a timer? Hum the [place] from beginning to end twice.  ; Rinse your hands well under clean, running water.  ; Dry your hands using a clean towel or air dry them. Avoid sharing personal household items   ; Do not share: You should not share dishes, drinking glasses, cups, eating utensils, towels, or bedding with other people or pets in your home.   ; Wash thoroughly after use: After using these items, they should be washed thoroughly with soap and water. Clean all high-touch surfaces everyday   ; Clean and disinfect: Practice routine cleaning of high touch surfaces.  ; High touch surfaces include counters, tabletops, doorknobs, bathroom fixtures, toilets, phones, keyboards, tablets, and bedside tables.  ; Disinfect areas with bodily fluids: Also, clean any surfaces that may have blood, stool, or body fluids on them.   ; Household : Use a household cleaning spray or wipe, according to the label instructions. Labels contain instructions for safe and effective use of the cleaning product including precautions you should take when applying the product, such as wearing gloves and making sure you have good ventilation during use of the product. Monitor your symptoms   Seek medical attention: Seek prompt medical attention if your illness is worsening     (e.g., difficulty breathing).   ; Call your doctor: Before seeking care, call your healthcare provider and tell them that you have, or are being evaluated for, COVID-19. ; Wear a facemask when sick: Put on a facemask before you enter the facility. These steps will help the healthcare provider's office to keep other people in the office or waiting room from getting infected or exposed. ; Alert health department: Ask your healthcare provider to call the local or state health department. Persons who are placed under active monitoring or facilitated self-monitoring should follow instructions provided by their local health department or occupational health professionals, as appropriate.  ; Call 911 if you have a medical emergency: If you have a medical emergency and need to call 911, notify the dispatch personnel that you have, or are being evaluated for COVID-19. If possible, put on a facemask before emergency medical services arrive. Orders Placed This Encounter   Procedures    Covid-19 Ambulatory     Standing Status:   Future     Standing Expiration Date:   5/1/2021     Scheduling Instructions:      Saline media preferred given current shortage of viral transport media but both acceptable     Order Specific Question:   Is this test for diagnosis or screening? Answer:   Diagnosis of ill patient     Order Specific Question:   Symptomatic for COVID-19 as defined by CDC? Answer:   Yes     Order Specific Question:   Date of Symptom Onset     Answer:   1/25/2021     Order Specific Question:   Hospitalized for COVID-19? Answer:   No     Order Specific Question:   Admitted to ICU for COVID-19? Answer:   No     Order Specific Question:   Employed in healthcare setting? Answer:   No     Order Specific Question:   Resident in a congregate (group) care setting? Answer:   Yes     Order Specific Question:   Pregnant: Answer:   No     Order Specific Question:   Previously tested for COVID-19? Answer:   No         Miguel Mcghee MD      Pt is aware that the insurance will be charged for this electric/telephone/virtual visit.

## 2021-02-02 LAB
SARS-COV-2: DETECTED
SOURCE: ABNORMAL

## 2021-05-03 ENCOUNTER — VIRTUAL VISIT (OUTPATIENT)
Dept: INTERNAL MEDICINE | Age: 36
End: 2021-05-03
Payer: MEDICARE

## 2021-05-03 DIAGNOSIS — G80.9 CEREBRAL PALSY, UNSPECIFIED TYPE (HCC): Primary | ICD-10-CM

## 2021-05-03 DIAGNOSIS — R62.50 DEVELOPMENT DELAY: ICD-10-CM

## 2021-05-03 DIAGNOSIS — Q21.0 VSD (VENTRICULAR SEPTAL DEFECT): ICD-10-CM

## 2021-05-03 DIAGNOSIS — Z99.3 WHEELCHAIR BOUND: ICD-10-CM

## 2021-05-03 DIAGNOSIS — G40.909 NONINTRACTABLE EPILEPSY WITHOUT STATUS EPILEPTICUS, UNSPECIFIED EPILEPSY TYPE (HCC): ICD-10-CM

## 2021-05-03 PROCEDURE — G8427 DOCREV CUR MEDS BY ELIG CLIN: HCPCS | Performed by: FAMILY MEDICINE

## 2021-05-03 PROCEDURE — 99214 OFFICE O/P EST MOD 30 MIN: CPT | Performed by: FAMILY MEDICINE

## 2021-05-03 ASSESSMENT — PATIENT HEALTH QUESTIONNAIRE - PHQ9: DEPRESSION UNABLE TO ASSESS: FUNCTIONAL CAPACITY MOTIVATION LIMITS ACCURACY

## 2021-05-03 NOTE — PROGRESS NOTES
Patient: Carolina Juarez    YOB: 1985    Date: 5/3/21       Patient Active Problem List    Diagnosis Date Noted    Hyperopia, bilateral 05/22/2019    Hx of falling 05/02/2019    VSD (ventricular septal defect) 05/02/2019     Overview Note:     S/p surgery for repair, seen by cards , yearly f/u      Legally blind 05/02/2019    Wheelchair bound 05/02/2019    Development delay 05/01/2019    Epilepsy ([facility] [address] 05/01/2019     Overview Note:           [facility] 05/01/2019    Acute post-hemorrhagic anemia 05/01/2019    Constipation 05/01/2019    Status post fracture of left hip 05/01/2019     Overview Note:     LT Hip Fracture S/P SHO       Past Medical History:   Diagnosis Date    Acute post-hemorrhagic anemia     Constipation     CP (cerebral palsy) (HCC)     Development delay     Epilepsy (HCC)     Hip fracture (HCC)     LT hip Fracture S/P SHO    Hx MRSA infection     Hx of fall     Legally blind      Past Surgical History:   Procedure Laterality Date    CARDIAC SURGERY      open heart D/t VSD    EYE MUSCLE SURGERY      INGUINAL HERNIA REPAIR       Family History   Family history unknown: Yes     Social History     Socioeconomic History    Marital status: Single     Spouse name: Not on file    Number of children: Not on file    Years of education: Not on file    Highest education level: Not on file   Occupational History    Not on file   Social Needs    Financial resource strain: Not hard at all   [place] insecurity     Worry: Never true     Inability: Never true    Transportation needs     Medical: Not on file     Non-medical: Not on file   Tobacco Use    Smoking status: Never Smoker    Smokeless tobacco: Never Used   Substance and Sexual Activity    Alcohol use: Not on file    Drug use: Not on file    Sexual activity: Not on file   Lifestyle    Physical activity     Days per week: Not on file     Minutes per session: Not on file    Stress: Not on file   Relationships    Social connections     Talks on phone: Not on file     Gets together: Not on file     Attends Congregation service: Not on file     Active member of club or organization: Not on file     Attends meetings of clubs or organizations: Not on file     Relationship status: Not on file    Intimate partner violence     Fear of current or ex partner: Not on file     Emotionally abused: Not on file     Physically abused: Not on file     Forced sexual activity: Not on file   Other Topics Concern    Not on file   Social History Narrative    Not on file     Current Outpatient Medications on File Prior to Visit   Medication Sig Dispense Refill    diphenhydrAMINE (BENADRYL) 25 MG capsule Take 25 mg by mouth every 8 hours as needed Do Not Exceed 300 mg per day      Calcium Carbonate-Simethicone (MAALOX MAX PO) Take by mouth Two tablespoons every 4 hours prn for indigestion.  guaiFENesin (ROBAFEN) 100 MG/5ML syrup Take 200 mg by mouth every 4 hours as needed for Cough       Incontinent Wash (PERINEAL CLEANSING EX) Apply topically      levETIRAcetam (KEPPRA) 250 MG tablet Take 1 tablet by mouth every morning 90 tablet 1    vitamin C (ASCORBIC ACID) 500 MG tablet Take 1 tablet by mouth daily 90 tablet 1    Multiple Vitamins-Minerals (THERAPEUTIC MULTIVITAMIN-MINERALS) tablet Take 1 tablet by mouth daily 90 tablet 1    levETIRAcetam (KEPPRA) 500 MG tablet Take 1 tablet by mouth nightly 90 tablet 1    Cholecalciferol (VITAMIN D3) 50 MCG (2000 UT) TABS Take 1 tablet by mouth daily      ketoconazole (NIZORAL) 2 % cream Apply topically daily. (Patient taking differently: Apply topically daily.  May hold when cleared and restart with flares) 30 g 1    acetaminophen (TYLENOL) 325 MG tablet Take 650 mg by mouth every 4 hours as needed for Pain       OXYGEN Inhale 2 L/min into the lungs      Sodium Phosphates (FLEET) 7-19 GM/118ML Place 1 enema rectally once as needed      neomycin-bacitracin-polymyxin (NEOSPORIN) 5-400-5000 ointment Apply topically 4 times daily as needed Apply topically 4 times daily.  ZINC OXIDE EX Apply topically as needed      Carbamide Peroxide (DEBROX OT) Place in ear(s) Instill 4 drops in affected ears twice daily for 4 days, flush on 5th day as needed.  ibuprofen (ADVIL;MOTRIN) 200 MG tablet Take 400 mg by mouth every 6 hours as needed for Pain       Magnesium Hydroxide (MILK OF MAGNESIA PO) Take 30 mLs by mouth as needed (constipation)       Probiotic Product (PROBIOTIC DAILY PO) Take by mouth 2 times daily as needed (when taking Abx)        No current facility-administered medications on file prior to visit. Allergies   Allergen Reactions    Ciprofloxacin        Chief Complaint   Patient presents with    Follow-up     TELEHEALTH EVALUATION -- Audio/Visual (During GFMJQ-45 public health emergency)    Due to COVID 19 outbreak, patient's office visit was converted to a virtual visit. Patient was contacted and agreed to proceed with a virtual visit via D[employer]. me  The risks and benefits of converting to a virtual visit were discussed in light of the current infectious disease epidemic. Patient also understood that insurance coverage and co-pays are up to their individual insurance plans. Pursuant to the emergency declaration under the [facility] waiver authority and the [employer] and [employer] Act, this Virtual  Visit was conducted, with patient's consent, to reduce the patient's risk of exposure to COVID-19 and provide continuity of care for an established patient. Services were provided through a video discussion virtually to substitute for in-person clinic visit. HPI    6 month routine follow-up    patient is verbal, has no concerns today.  He is Wheelchair bound     He resides in a group home and is seen today with his caregiver who also has no concerns for pt.     Problem list, PMHx, SHx, FHx, Medications: all reviewed and updated in [facility]  preventative care all discussed and reviewed today     Patient has hx of VSD s/p repair  He currently has no SOB, CP, LE edema  Echo done and referred to cardiology  S/p ECHO on 5-28-19: with EF of 60%, no VSD, mild PI.   EKG: normal sinus rhythm, RBBB  Cardiology recommended - ABX prior to more advanced/invasive  dental procedures. Seeing cardiology yearly    Seborrhea - started on topical antifungal last visit, all cleared up now    Epilepsy  No longer seeing neurology  No recent seizures    Labs from 8/20/2020  H/H  14.1/43.1      Review of Systems  Constitutional: Negative for fatigue, fever and sweats. HEENT: Negative for eye discharge and vision loss. Negative for ear drainage, hearing loss and nasal drainage. Respiratory: Negative for cough, dyspnea and wheezing. Cardiovascular:  Negative for chest pain, claudication and irregular heartbeat/palpitations. Physical Exam  Nursing note reviewed. Constitutional:       General: no acute distress. Appearance: Normal appearance. normal weight. not ill-appearing, toxic-appearing or diaphoretic. HENT:      Head: Normocephalic and atraumatic. Right Ear: External ear normal.      Left Ear: External ear normal.      Nose: Nose normal.   Eyes:      General: No scleral icterus. Right eye: No discharge. Left eye: No discharge. Extraocular Movements: Extraocular movements intact. Conjunctiva/sclera: Conjunctivae normal.   Neck:      Musculoskeletal: on wheelchair  Pulmonary:      Effort: Pulmonary effort is normal.   Neurological:      Mental Status: alert. Mental status is at baseline. Psychiatric:         Attention and Perception: Attention and perception normal.         Mood and Affect: Mood and affect normal.         Speech: Speech normal.         Behavior: Behavior normal. Behavior is cooperative.      Due to this being a TeleHealth encounter, evaluation of the following organ systems is limited: Vitals/Constitutional/EENT/Resp/CV/GI//MS/Neuro/Skin/Heme-Lymph-Imm. Assessment/PLAN:  Cecilia Baca was seen today for follow-up. Diagnoses and all orders for this visit:    Development delay  Cerebral palsy, unspecified type ([address]  Wheelchair bound  Unchanged    Nonintractable epilepsy without status epilepticus, unspecified epilepsy type ([address]  Stable, controlled  Plan: continue current medications  Cont f/u neuro    VSD (ventricular septal defect)  Rev echo  No current symptoms  EKG - RBBB  Cont yearly f/u cardiology        Return in about 6 months (around 11/3/2021) for AWV.

## 2021-06-15 ENCOUNTER — VIRTUAL VISIT (OUTPATIENT)
Dept: CARDIOLOGY CLINIC | Age: 36
End: 2021-06-15
Payer: MEDICARE

## 2021-06-15 DIAGNOSIS — Q21.0 VSD (VENTRICULAR SEPTAL DEFECT): Primary | ICD-10-CM

## 2021-06-15 PROCEDURE — 1036F TOBACCO NON-USER: CPT | Performed by: INTERNAL MEDICINE

## 2021-06-15 PROCEDURE — G8428 CUR MEDS NOT DOCUMENT: HCPCS | Performed by: INTERNAL MEDICINE

## 2021-06-15 PROCEDURE — G8421 BMI NOT CALCULATED: HCPCS | Performed by: INTERNAL MEDICINE

## 2021-06-15 PROCEDURE — 99213 OFFICE O/P EST LOW 20 MIN: CPT | Performed by: INTERNAL MEDICINE

## 2021-06-15 ASSESSMENT — ENCOUNTER SYMPTOMS
SHORTNESS OF BREATH: 0
VOMITING: 0
GASTROINTESTINAL NEGATIVE: 1
EYES NEGATIVE: 1
ABDOMINAL PAIN: 0
WHEEZING: 0
NAUSEA: 0

## 2021-06-15 NOTE — PROGRESS NOTES
5-400-5000 ointment Apply topically 4 times daily as needed Apply topically 4 times daily. Historical Provider, MD   [address] topically as needed  Historical Provider, MD   Carbamide Peroxide (DEBROX OT) Place in ear(s) Instill 4 drops in affected ears twice daily for 4 days, flush on 5th day as needed.   Historical Provider, MD   ibuprofen (ADVIL;MOTRIN) 200 MG tablet Take 400 mg by mouth every 6 hours as needed for Pain   Historical Provider, MD   Magnesium Hydroxide (MILK OF MAGNESIA PO) Take 30 mLs by mouth as needed (constipation)   Historical Provider, MD   Probiotic Product (PROBIOTIC DAILY PO) Take by mouth 2 times daily as needed (when taking Abx)   Historical Provider, MD       Social History     Tobacco Use    Smoking status: Never Smoker    Smokeless tobacco: Never Used   Vaping Use    Vaping Use: Never used   Substance Use Topics    Alcohol use: Not on file    Drug use: Not on file        Allergies   Allergen Reactions    Ciprofloxacin    ,   Past Medical History:   Diagnosis Date    Acute post-hemorrhagic anemia     Constipation     [facility] [address]     Development delay     Epilepsy ([facility] [address]     Hip fracture ([facility] [address]     LT hip Fracture S/P SHO    Hx MRSA infection     Hx of fall     Legally blind    ,   Past Surgical History:   Procedure Laterality Date    CARDIAC SURGERY      open heart D/t VSD    EYE MUSCLE SURGERY      INGUINAL HERNIA REPAIR     ,   Family History   Family history unknown: Yes       PHYSICAL EXAMINATION:  [ INSTRUCTIONS:  \"[x]\" Indicates a positive item  \"[]\" Indicates a negative item  -- DELETE ALL ITEMS NOT EXAMINED]  [x] Alert  [x] Oriented to person/place/time    [x] No apparent distress  [] Toxic appearing    [] Face flushed appearing [x] Sclera clear  [] Lips are cyanotic      [x] Breathing appears normal  [] Appears tachypneic      [] Rash on visible skin    [] Cranial Nerves II-XII grossly intact    [] Motor grossly intact in visible upper extremities    [] Motor grossly intact in visible lower extremities    [x] Normal Mood  [] Anxious appearing    [] Depressed appearing  [] Confused appearing      [] Poor short term memory  [] Poor long term memory    [] OTHER:      Due to this being a TeleHealth encounter, evaluation of the following organ systems is limited: Vitals/Constitutional/EENT/Resp/CV/GI//MS/Neuro/Skin/Heme-Lymph-Imm. ASSESSMENT:        Diagnosis Orders   1. VSD (ventricular septal defect)         PLAN:  Patient will need to continue to follow up with you for their general medical care     As always, aggressive risk factor modification is strongly recommended. We should adhere to the JNC VIII guidelines for HTN management and the NCEP ATP III guidelines for LDL-C management.     Cardiac diet is always recommended with low fat, cholesterol, calories and sodium.     Continue medications at current doses.      Check ECHO in future for VSD monitoring     Check EKG next OV     ABX prior to more advanced/invasive dental procedures. No follow-ups on file. An  electronic signature was used to authenticate this note. --Coit Halo, DO on 6/15/2021 at 11:20 AM  9}    Pursuant to the emergency declaration under the [facility] waiver authority and the [employer] and [employer] Act, this Virtual  Visit was conducted, with patient's consent, to reduce the patient's risk of exposure to COVID-19 and provide continuity of care for an established patient. Services were provided through a video synchronous discussion virtually to substitute for in-person clinic visit. Total Virtual Visit time spent: 11 min. Please note this report has been partially produced using speech recognition software and may cause contain errors related to that system including grammar, punctuation and spelling as well as words and phrases that may seem inappropriate.  If

## 2021-10-21 NOTE — TELEPHONE ENCOUNTER
Indu called to say she got word that Dr. Juan Walker ordered the benadryl. She would like to know if Claritin should be d/c. yes

## 2022-06-14 ENCOUNTER — HOSPITAL ENCOUNTER (OUTPATIENT)
Dept: PHYSICAL THERAPY | Age: 37
Setting detail: THERAPIES SERIES
Discharge: HOME | End: 2022-06-14
Payer: MEDICARE

## 2022-06-14 PROCEDURE — 97530 THERAPEUTIC ACTIVITIES: CPT

## 2022-06-14 PROCEDURE — 97163 PT EVAL HIGH COMPLEX 45 MIN: CPT

## 2022-06-14 NOTE — PROGRESS NOTES
Physical Therapy: Evaluation Wheelchair cushion and seating    Patient: Jayde Palacios (6 y.o. 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 [address]     Development delay     Epile[facility] [address]     Hip fracture ([facility] [address]     LT hip Fracture S/P SHO    Hx MRSA infection     Hx of fall     Legally blind      Surgical History:   Past Surgical History:   Procedure Laterality Date    CARDIAC SURGERY      open heart D/t VSD    EYE MUSCLE SURGERY      INGUINAL HERNIA REPAIR         Medications:   Current Outpatient Medications:     diphenhydrAMINE (BENADRYL) 25 MG capsule, Take 25 mg by mouth every 8 hours as needed Do Not Exceed 300 mg per day, Disp: , Rfl:     Calcium Carbonate-Simethicone (MAALOX MAX PO), Take by mouth Two tablespoons every 4 hours prn for indigestion. , Disp: , Rfl:     guaiFENesin (ROBAFEN) 100 MG/5ML syrup, Take 200 mg by mouth every 4 hours as needed for Cough , Disp: , Rfl:     Incontinent Wash (PERINEAL CLEANSING EX), Apply topically, Disp: , Rfl:     levETIRAcetam (KEPPRA) 250 MG tablet, Take 1 tablet by mouth every morning, Disp: 90 tablet, Rfl: 1    vitamin C (ASCORBIC ACID) 500 MG tablet, Take 1 tablet by mouth daily, Disp: 90 tablet, Rfl: 1    Multiple Vitamins-Minerals (THERAPEUTIC MULTIVITAMIN-MINERALS) tablet, Take 1 tablet by mouth daily, Disp: 90 tablet, Rfl: 1    levETIRAcetam (KEPPRA) 500 MG tablet, Take 1 tablet by mouth nightly, Disp: 90 tablet, Rfl: 1    Cholecalciferol (VITAMIN D3) 50 MCG (2000 UT) TABS, Take 1 tablet by mouth daily, Disp: , Rfl:     ketoconazole (NIZORAL) 2 % cream, Apply topically daily. (Patient taking differently: Apply topically daily. May hold when cleared and restart with flares), Disp: 30 g, Rfl: 1    acetaminophen (TYLENOL) 325 MG tablet, Take 650 mg by mouth every 4 hours as needed for Pain , Disp: , Rfl:     OXYGEN, Inhale 2 L/min into the lungs, Disp: , Rfl:     Sodium Phosphates (FLEET) 7-19 GM/118ML, Place 1 enema rectally once as needed, Disp: , Rfl:     neomycin-bacitracin-polymyxin (NEOSPORIN) 5-400-5000 ointment, Apply topically 4 times daily as needed Apply topically 4 times daily. , Disp: , Rfl:     ZINC OXIDE EX, Apply topically as needed, Disp: , Rfl:     Carbamide Peroxide (DEBROX OT), Place in ear(s) Instill 4 drops in affected ears twice daily for 4 days, flush on 5th day as needed. , Disp: , Rfl:     ibuprofen (ADVIL;MOTRIN) 200 MG tablet, Take 400 mg by mouth every 6 hours as needed for Pain , Disp: , Rfl:     Magnesium Hydroxide (MILK OF MAGNESIA PO), Take 30 mLs by mouth as needed (constipation) , Disp: , Rfl:     Probiotic Product (PROBIOTIC DAILY PO), Take by mouth 2 times daily as needed (when taking Abx) , Disp: , Rfl:   Allergies: Ciprofloxacin      SUBJECTIVE EXAMINATION      ,           Subjective History:  Onset Date: 05/18/22  Subjective: Pt reports would like better back and armrests to \"be more comfortable in w/c and be able to push it better. \"  Additional Pertinent Hx (if applicable): cerebral palsy, wheelchiar bound for mnay years; able to propel ultralighweight w/c in facility with BUE and supervision; current wheelchair seating and pads old and beyond repair- on [address] w/c - purchased November of 2016          Learning/Language: Learning  Does the patient/guardian have any barriers to learning?: No barriers  Will there be a co-learner?: No  Is an  required?: No     Pain Screening   Pain Screening  Patient Currently in Pain: No    Functional Status         Social History:lives in facility with 24 hour care available          OBJECTIVE EXAMINATION   Restrictions:        Position Activity Restriction  Other position/activity restrictions: w/c bound, nonambulatory with LE weakness and inc tone due to cerebral palsy    Observations:  General Observations  Description: Pt arrives to PT seating for wheelchair evaluaiton with back rest, laterals amnd arm pads torn and beyond repair, seat cushion missing support and cover. Pt needs new wheelchair seating. Locks, tires and footrests remain intact.   Sitting at edge of mat noted to have level iliac crest and PSIS, but holds self in moderately increased post pelvic tilt; mild R convex thoracic scoliosis- pt with increasing trunk flexion with unsupported sitting x 10 mintues- reires contour pbakc with lateral support to maintian upright sitting in w/c for self propulsion and dec falls risk- recommend lap belt and shoudler harness    Palpation: pelvis inc  post tilted, level PSIS and iliac crest sitting on mat table, inc trunk flex with fatigue approx 8-10 minutes       Ambulation/Gait (if applicable):  Ambulation  Comments: nonambulatory, w/c bound from CP  Wheelchair Activities  Wheelchair Size: 16x16  Wheelchair Type: Standard  Wheelchair Cushion: Pressure Relieving (contour to assist posture and AROM- nonfunctional with dec control for foot propulsion,           ASSESSMENT     Impression: Assessment: Pt current wheelchair seating and cushion is on ultralight wheelchair old and beyond repair. . Recommend new J easy chsion 16x16 curved with lateral swing away supports to asiist prologned balnce and adjust for scoliosis in w/c- increase sitting tolerance and comfort. Recommend shoudler harness for safety when [facility] at w/c level to appointments. New arm length desk pads also needed as torn, old and beyond repair. Current ultralinght w/c appears to be funcnitonasl at frame, legrests and tires/ casters. New seating and pad to be provided by vendor- [employer]. Statement of Medical Necessity: Physical Therapy is both indicated and medically necessary as outlined in the POC to increase the likelihood of meeting the functionally related goals stated below. Patient's Activity Tolerance:        Patient's rehabilitation potential/prognosis is considered to be: Factors which may impact rehabilitation potential include:          GOALS   Patient Goal(s): receive new seating and paseds for ultralight w/c for contued safety and independnecn propelling ultralight w/c in facility  Short Term Goals Completed by 1 visit Goal Status   Complete w/c seating eval with PT and vendor Met   complete eval write up and justification for new w/c seating , pads, harness Met                             TREATMENT PLAN            Pt. actively involved in establishing Plan of Care and Goals: Yes  Patient/ Caregiver education and instruction:           Educated pt and caregiver on process, wheelchair repair needs, results of examination. Discussed option with pt , caregiver and vendor.      Treatment may include any combination of the following:       Frequency / Duration:  Patient to be seen 1 visit for assessment- complete justification adn eval for new w/c seating / pads/ support for   weeks      Eval Complexity:  high complex     Therapy Time  Individual Time In:       200  Individual Time Out:  315  Minutes:  75        Therapist Signature: Calista Felty, PT    Date: 0/52/9720     I certify that the above Therapy Services are being furnished while the patient is under my care. I agree with the treatment plan and certify that this therapy is necessary. Physician's Signature:  ___________________________   Date:_______                                                                   Pratima Corado MD        Physician Comments: _______________________________________________    Please sign and return to [facility]. Please fax to the location listed below.  Humberto Thomas for this referral!    [facility]  [address] [place] [address] [address] [id number]  Dept: [phone]  Dept Fax: [phone] : [phone]       POC NOTE

## 2022-07-19 ENCOUNTER — OFFICE VISIT (OUTPATIENT)
Dept: CARDIOLOGY CLINIC | Age: 37
End: 2022-07-19
Payer: MEDICARE

## 2022-07-19 VITALS
RESPIRATION RATE: 15 BRPM | SYSTOLIC BLOOD PRESSURE: 122 MMHG | BODY MASS INDEX: 22.24 KG/M2 | DIASTOLIC BLOOD PRESSURE: 80 MMHG | OXYGEN SATURATION: 98 % | HEIGHT: 66 IN | HEART RATE: 77 BPM

## 2022-07-19 DIAGNOSIS — Q21.0 VSD (VENTRICULAR SEPTAL DEFECT): Primary | ICD-10-CM

## 2022-07-19 PROCEDURE — 99213 OFFICE O/P EST LOW 20 MIN: CPT | Performed by: INTERNAL MEDICINE

## 2022-07-19 PROCEDURE — 93000 ELECTROCARDIOGRAM COMPLETE: CPT | Performed by: INTERNAL MEDICINE

## 2022-07-19 ASSESSMENT — ENCOUNTER SYMPTOMS
ABDOMINAL PAIN: 0
VOMITING: 0
NAUSEA: 0
GASTROINTESTINAL NEGATIVE: 1
EYES NEGATIVE: 1
WHEEZING: 0
SHORTNESS OF BREATH: 0

## 2022-07-19 NOTE — PROGRESS NOTES
2022        HPI:    19: Patient presents for initial medical evaluation. Patient is followed on a regular basis by Dr. Francy Moore MD. Patient with hx of cerebral palsy. Hx of VSD s/p repair in the past. Pt denies chest pain, dyspnea, dyspnea on exertion, change in exercise capacity, fatigue,  nausea, vomiting, diarrhea, constipation, motor weakness, insomnia, weight loss, syncope, dizziness, lightheadedness, palpitations, PND, orthopnea. EKG with NSR, IRBBB. S/p ECHO on 19: with EF of 60%, no VSD, mild PI.      20:   [employer] (:  1985) has requested an audio/video evaluation for the following concern(s):    Hx of VSD repair. . Pt denies chest pain, dyspnea, dyspnea on exertion, change in exercise capacity, fatigue,  nausea, vomiting, diarrhea, constipation, motor weakness, insomnia, weight loss, syncope, dizziness, lightheadedness, palpitations, PND, orthopnea, or claudication. LDL is 128.     6/15/2021: Patient with history of VSD repair. No issues reported by facility. He is doing well overall. No recent hospitalizations. Pt denies chest pain, dyspnea, dyspnea on exertion, change in exercise capacity, fatigue,  nausea, vomiting, diarrhea, constipation, motor weakness, insomnia, weight loss, syncope, dizziness, lightheadedness, palpitations, PND, orthopnea, or claudication. Echo in 2019 with no VSD mild pulmonary insufficiency, ejection fraction of 60%. 22: hx of VSD repair. Doing well. Pt denies chest pain, dyspnea, dyspnea on exertion, change in exercise capacity, fatigue,  nausea, vomiting, diarrhea, constipation, motor weakness, insomnia, weight loss, syncope, dizziness, lightheadedness, palpitations, PND, orthopnea, or claudication. Echo in 2019 with no VSD mild pulmonary insufficiency, ejection fraction of 60%. EKG with NSR, no ischemia. Review of Systems   Constitutional: Negative. Negative for chills and fever. Eyes: Negative. Respiratory:  Negative for shortness of breath and wheezing. Cardiovascular:  Negative for chest pain, palpitations and leg swelling. Gastrointestinal: Negative. Negative for abdominal pain, nausea and vomiting. Skin: Negative. Negative for rash. Neurological:  Negative for dizziness, weakness and headaches. Prior to Visit Medications    Medication Sig Taking? Authorizing Provider   diphenhydrAMINE (BENADRYL) 25 MG capsule Take 25 mg by mouth every 8 hours as needed Do Not Exceed 300 mg per day Yes Historical Provider, MD   Calcium Carbonate-Simethicone (MAALOX MAX PO) Take by mouth Two tablespoons every 4 hours prn for indigestion. Yes Historical Provider, MD   guaiFENesin (ROBITUSSIN) 100 MG/5ML syrup Take 200 mg by mouth every 4 hours as needed for Cough  Yes Historical Provider, MD   Incontinent Wash (PERINEAL CLEANSING EX) Apply topically Yes Historical Provider, MD   levETIRAcetam (KEPPRA) 250 MG tablet Take 1 tablet by mouth every morning Yes Hung Guidry MD   vitamin C (ASCORBIC ACID) 500 MG tablet Take 1 tablet by mouth daily Yes Hung Guidry MD   Multiple Vitamins-Minerals (THERAPEUTIC MULTIVITAMIN-MINERALS) tablet Take 1 tablet by mouth daily Yes Hung Guidry MD   levETIRAcetam (KEPPRA) 500 MG tablet Take 1 tablet by mouth nightly Yes Hung Guidry MD   Cholecalciferol (VITAMIN D3) 50 MCG (2000 UT) TABS Take 1 tablet by mouth daily Yes Historical Provider, MD   ketoconazole (NIZORAL) 2 % cream Apply topically daily. Patient taking differently: Apply topically daily.  May hold when cleared and restart with flares Yes Hung Guidry MD   acetaminophen (TYLENOL) 325 MG tablet Take 650 mg by mouth every 4 hours as needed for Pain  Yes Historical Provider, MD   OXYGEN Inhale 2 L/min into the lungs Yes Historical Provider, MD   Sodium Phosphates (FLEET) 7-19 GM/118ML Place 1 enema rectally once as needed Yes Historical Provider, MD   neomycin-bacitracin-polymyxin (NEOSPORIN) 5-400-5000 ointment Apply topically 4 times daily as needed Apply topically 4 times daily. Yes Historical Provider, MD   ZINC OXIDE EX Apply topically as needed Yes Historical Provider, MD   Carbamide Peroxide (DEBROX OT) Place in ear(s) Instill 4 drops in affected ears twice daily for 4 days, flush on 5th day as needed. Yes Historical Provider, MD   ibuprofen (ADVIL;MOTRIN) 200 MG tablet Take 400 mg by mouth every 6 hours as needed for Pain  Yes Historical Provider, MD   Magnesium Hydroxide (MILK OF MAGNESIA PO) Take 30 mLs by mouth as needed (constipation)  Yes Historical Provider, MD   Probiotic Product (PROBIOTIC DAILY PO) Take by mouth 2 times daily as needed (when taking Abx)  Yes Historical Provider, MD       Social History     Tobacco Use    Smoking status: Never    Smokeless tobacco: Never   Vaping Use    Vaping Use: Never used        Allergies   Allergen Reactions    Ciprofloxacin    ,   Past Medical History:   Diagnosis Date    Acute post-hemorrhagic anemia     Constipation     CP (cerebral palsy) (HCC)     Development delay     Epilepsy (HCC)     Hip fracture (HCC)     LT hip Fracture S/P SHO    Hx MRSA infection     Hx of fall     Legally blind    ,   Past Surgical History:   Procedure Laterality Date    CARDIAC SURGERY      open heart D/t VSD    EYE MUSCLE SURGERY      INGUINAL HERNIA REPAIR     ,   Family History   Family history unknown: Yes     Physical Examination:  General appearance - alert, well appearing, and in no distress  Mental status - alert, oriented to person, place, and time  Neck - Neck is supple, no JVD or carotid bruits. No thyromegaly or adenopathy.    Chest - clear to auscultation, no wheezes, rales or rhonchi, symmetric air entry  Heart - normal rate, regular rhythm, normal S1, S2, no murmurs, rubs, clicks or gallops  Abdomen - soft, nontender, nondistended, no masses or organomegaly  Neurological - alert, oriented, normal speech, no focal findings or movement disorder noted  Extremities - peripheral pulses normal, no pedal edema, no clubbing or cyanosis  Skin - normal coloration and turgor, no rashes, no suspicious skin lesions noted    ASSESSMENT:        Diagnosis Orders   1. VSD (ventricular septal defect)  EKG 12 lead          PLAN:  Patient will need to continue to follow up with you for their general medical care     As always, aggressive risk factor modification is strongly recommended. We should adhere to the JNC VIII guidelines for HTN management and the NCEP ATP III guidelines for LDL-C management. Cardiac diet is always recommended with low fat, cholesterol, calories and sodium. Continue medications at current doses. Check ECHO in future for VSD monitoring     Check EKG      ABX prior to more advanced/invasive dental procedures. Return in about 1 year (around 7/19/2023). An  electronic signature was used to authenticate this note.     --Coty Reilly, DO on 7/19/2022 at 10:34 AM  9}

## 2023-03-31 ENCOUNTER — APPOINTMENT (OUTPATIENT)
Dept: CT IMAGING | Age: 38
End: 2023-03-31
Payer: MEDICARE

## 2023-03-31 ENCOUNTER — HOSPITAL ENCOUNTER (EMERGENCY)
Age: 38
Discharge: HOME | End: 2023-03-31
Payer: MEDICARE

## 2023-03-31 ENCOUNTER — APPOINTMENT (OUTPATIENT)
Dept: GENERAL RADIOLOGY | Age: 38
End: 2023-03-31
Payer: MEDICARE

## 2023-03-31 VITALS
BODY MASS INDEX: 24.21 KG/M2 | WEIGHT: 150 LBS | DIASTOLIC BLOOD PRESSURE: 92 MMHG | RESPIRATION RATE: 19 BRPM | OXYGEN SATURATION: 98 % | TEMPERATURE: 96.8 F | SYSTOLIC BLOOD PRESSURE: 135 MMHG | HEART RATE: 92 BPM

## 2023-03-31 DIAGNOSIS — G40.919 BREAKTHROUGH SEIZURE (HCC): Primary | ICD-10-CM

## 2023-03-31 LAB
ALBUMIN SERPL-MCNC: 4.2 G/DL (ref 3.5–4.6)
ALP SERPL-CCNC: 111 U/L (ref 35–104)
ALT SERPL-CCNC: 26 U/L (ref 0–41)
AMPHETAMINES UR QL SCN>500 NG/ML: NORMAL
ANION GAP SERPL CALCULATED.3IONS-SCNC: 19 MEQ/L (ref 9–15)
AST SERPL-CCNC: 38 U/L (ref 0–40)
BACTERIA URNS QL MICRO: NEGATIVE /HPF
BARBITURATES UR QL SCN>200 NG/ML: NORMAL
BASOPHILS # BLD: 0 K/UL (ref 0–0.1)
BASOPHILS NFR BLD: 0.4 % (ref 0.2–1.2)
BENZODIAZ UR QL SCN: NORMAL
BILIRUB SERPL-MCNC: <0.2 MG/DL (ref 0.2–0.7)
BILIRUB UR QL STRIP: NEGATIVE
BUN SERPL-MCNC: 15 MG/DL (ref 6–20)
CALCIUM SERPL-MCNC: 9.4 MG/DL (ref 8.5–9.9)
CHLORIDE SERPL-SCNC: 100 MEQ/L (ref 95–107)
CLARITY UR: CLEAR
CO2 SERPL-SCNC: 20 MEQ/L (ref 20–31)
COCAINE UR QL SCN: NORMAL
COLOR UR: YELLOW
CREAT SERPL-MCNC: 0.91 MG/DL (ref 0.7–1.2)
DRUG SCREEN COMMENT UR-IMP: NORMAL
EKG ATRIAL RATE: 107 BPM
EKG P AXIS: 42 DEGREES
EKG P-R INTERVAL: 134 MS
EKG Q-T INTERVAL: 322 MS
EKG QRS DURATION: 76 MS
EKG QTC CALCULATION (BAZETT): 429 MS
EKG R AXIS: 67 DEGREES
EKG T AXIS: 47 DEGREES
EKG VENTRICULAR RATE: 107 BPM
EOSINOPHIL # BLD: 0.1 K/UL (ref 0–0.5)
EOSINOPHIL NFR BLD: 0.6 % (ref 0.8–7)
EPI CELLS #/AREA URNS HPF: ABNORMAL /HPF
ERYTHROCYTE [DISTWIDTH] IN BLOOD BY AUTOMATED COUNT: 11.9 % (ref 11.6–14.4)
GLOBULIN SER CALC-MCNC: 3.3 G/DL (ref 2.3–3.5)
GLUCOSE SERPL-MCNC: 86 MG/DL (ref 70–99)
GLUCOSE UR STRIP-MCNC: NEGATIVE MG/DL
HCT VFR BLD AUTO: 46.4 % (ref 42–52)
HGB BLD-MCNC: 15.6 G/DL (ref 13.7–17.5)
HGB UR QL STRIP: NORMAL
IMM GRANULOCYTES # BLD: 0 K/UL
IMM GRANULOCYTES NFR BLD: 0.4 %
INFLUENZA A BY PCR: NEGATIVE
INFLUENZA B BY PCR: NEGATIVE
KETONES UR STRIP-MCNC: NEGATIVE MG/DL
LACTATE BLDV-SCNC: 4.4 MMOL/L (ref 0.5–2.2)
LACTATE BLDV-SCNC: 8.2 MMOL/L (ref 0.5–2.2)
LEUKOCYTE ESTERASE UR QL STRIP: NEGATIVE
LYMPHOCYTES # BLD: 1.6 K/UL (ref 1.3–3.6)
LYMPHOCYTES NFR BLD: 17.6 %
MAGNESIUM SERPL-MCNC: 2.3 MG/DL (ref 1.7–2.4)
MCH RBC QN AUTO: 29.9 PG (ref 25.7–32.2)
MCHC RBC AUTO-ENTMCNC: 33.6 % (ref 32.3–36.5)
MCV RBC AUTO: 88.9 FL (ref 79–92.2)
MONOCYTES # BLD: 0.7 K/UL (ref 0.3–0.8)
MONOCYTES NFR BLD: 7.7 % (ref 5.3–12.2)
NEUTROPHILS # BLD: 6.8 K/UL (ref 1.8–5.4)
NEUTS SEG NFR BLD: 73.3 % (ref 34–67.9)
NITRITE UR QL STRIP: NEGATIVE
OPIATES UR QL SCN: NORMAL
PCP UR QL SCN>25 NG/ML: NORMAL
PH UR STRIP: 5 [PH] (ref 5–9)
PLATELET # BLD AUTO: 266 K/UL (ref 163–337)
POTASSIUM SERPL-SCNC: 3.9 MEQ/L (ref 3.4–4.9)
POTASSIUM SERPL-SCNC: 5.7 MEQ/L (ref 3.4–4.9)
PROT SERPL-MCNC: 7.5 G/DL (ref 6.3–8)
PROT UR STRIP-MCNC: NORMAL MG/DL
RBC # BLD AUTO: 5.22 M/UL (ref 4.63–6.08)
RBC #/AREA URNS HPF: ABNORMAL /HPF (ref 0–2)
SARS-COV-2 RDRP RESP QL NAA+PROBE: NOT DETECTED
SODIUM SERPL-SCNC: 139 MEQ/L (ref 135–144)
SP GR UR STRIP: >=1.03 (ref 1–1.03)
THC UR QL SCN>50 NG/ML: NORMAL
TRICYCLICS UR QL SCN: NORMAL
TROPONIN T SERPL-MCNC: <0.01 NG/ML (ref 0–0.01)
URINE REFLEX TO CULTURE: NORMAL
UROBILINOGEN UR STRIP-ACNC: 0.2 E.U./DL
WBC # BLD AUTO: 9.3 K/UL (ref 4.2–9)
WBC #/AREA URNS HPF: ABNORMAL /HPF (ref 0–5)

## 2023-03-31 PROCEDURE — 2580000003 HC RX 258

## 2023-03-31 PROCEDURE — 83735 ASSAY OF MAGNESIUM: CPT

## 2023-03-31 PROCEDURE — 6360000002 HC RX W HCPCS

## 2023-03-31 PROCEDURE — 71045 X-RAY EXAM CHEST 1 VIEW: CPT

## 2023-03-31 PROCEDURE — 80306 DRUG TEST PRSMV INSTRMNT: CPT

## 2023-03-31 PROCEDURE — 87635 SARS-COV-2 COVID-19 AMP PRB: CPT

## 2023-03-31 PROCEDURE — 85025 COMPLETE CBC W/AUTO DIFF WBC: CPT

## 2023-03-31 PROCEDURE — 96365 THER/PROPH/DIAG IV INF INIT: CPT

## 2023-03-31 PROCEDURE — 87502 INFLUENZA DNA AMP PROBE: CPT

## 2023-03-31 PROCEDURE — 96361 HYDRATE IV INFUSION ADD-ON: CPT

## 2023-03-31 PROCEDURE — 84484 ASSAY OF TROPONIN QUANT: CPT

## 2023-03-31 PROCEDURE — 83605 ASSAY OF LACTIC ACID: CPT

## 2023-03-31 PROCEDURE — 84132 ASSAY OF SERUM POTASSIUM: CPT

## 2023-03-31 PROCEDURE — 36415 COLL VENOUS BLD VENIPUNCTURE: CPT

## 2023-03-31 PROCEDURE — 99285 EMERGENCY DEPT VISIT HI MDM: CPT

## 2023-03-31 PROCEDURE — 81001 URINALYSIS AUTO W/SCOPE: CPT

## 2023-03-31 PROCEDURE — 80053 COMPREHEN METABOLIC PANEL: CPT

## 2023-03-31 PROCEDURE — 70450 CT HEAD/BRAIN W/O DYE: CPT

## 2023-03-31 PROCEDURE — 93005 ELECTROCARDIOGRAM TRACING: CPT

## 2023-03-31 PROCEDURE — 80177 DRUG SCRN QUAN LEVETIRACETAM: CPT

## 2023-03-31 RX ORDER — LEVETIRACETAM 1000 MG/1
1000 TABLET ORAL EVERY 12 HOURS
Qty: 60 TABLET | Refills: 0 | Status: SHIPPED | OUTPATIENT
Start: 2023-03-31

## 2023-03-31 RX ORDER — 0.9 % SODIUM CHLORIDE 0.9 %
500 INTRAVENOUS SOLUTION INTRAVENOUS ONCE
Status: COMPLETED | OUTPATIENT
Start: 2023-03-31 | End: 2023-03-31

## 2023-03-31 RX ADMIN — SODIUM CHLORIDE 500 ML: 9 INJECTION, SOLUTION INTRAVENOUS at 11:44

## 2023-03-31 RX ADMIN — SODIUM CHLORIDE 1000 MG: 9 INJECTION, SOLUTION INTRAVENOUS at 11:45

## 2023-03-31 ASSESSMENT — ENCOUNTER SYMPTOMS
SORE THROAT: 0
BACK PAIN: 0
DIARRHEA: 0
ABDOMINAL PAIN: 0
NAUSEA: 0
VOMITING: 0
COUGH: 0
SHORTNESS OF BREATH: 0

## 2023-03-31 ASSESSMENT — PAIN - FUNCTIONAL ASSESSMENT
PAIN_FUNCTIONAL_ASSESSMENT: NONE - DENIES PAIN

## 2023-03-31 NOTE — DISCHARGE INSTRUCTIONS
Discussed case with Neurologist Dr. Dwayne Dias he would like patient placed on Keppra 1000mg every 12 hours (starting Saturday 4/1/2023). He would like to round on patient at [address] [place] [address].

## 2023-03-31 NOTE — ED NOTES
Pt's sister and legal guardian, Jose Beaver, was given d/c instructions and is aware Kalenangelika Grabielwilber will be transporting pt to [facility]. Pt's sister is also aware pt's Keppra dose will increase from 250 in am and 500 in pm, to 1000 in am and 1000 in pm.  Pt's sister voiced understanding without further questions.       Miroslava Iqbal, RYAN  03/31/23 [id number] José Miguel Landaverde RN  03/31/23 [id number]

## 2023-03-31 NOTE — ED NOTES
Pt report is given to [employer],  at [facility]. [employer] is informed of Keppra dose change from 250 mg, PO in am and 500 mg, PO in pm, to 1000 mg PO every 12 hours. [employer] is informed the pt received 1000 mg IVPB Keppra in the ED and will not have to start this new dose until tomorrow, and the pt should f/u with Dr. Marifer Unger. [employer] voiced understanding of d/c instructions without further questions.       Otilia Vu RN  03/31/23 [id number]

## 2023-04-02 LAB — LEVETIRACETAM SERPL-MCNC: 12 UG/ML (ref 10–40)

## 2023-04-03 LAB
EKG ATRIAL RATE: 107 BPM
EKG P AXIS: 42 DEGREES
EKG P-R INTERVAL: 134 MS
EKG Q-T INTERVAL: 322 MS
EKG QRS DURATION: 76 MS
EKG QTC CALCULATION (BAZETT): 429 MS
EKG R AXIS: 67 DEGREES
EKG T AXIS: 47 DEGREES
EKG VENTRICULAR RATE: 107 BPM

## 2023-04-03 PROCEDURE — 93010 ELECTROCARDIOGRAM REPORT: CPT | Performed by: INTERNAL MEDICINE

## 2023-07-18 ENCOUNTER — OFFICE VISIT (OUTPATIENT)
Dept: CARDIOLOGY CLINIC | Age: 38
End: 2023-07-18
Payer: MEDICARE

## 2023-07-18 VITALS
OXYGEN SATURATION: 95 % | DIASTOLIC BLOOD PRESSURE: 82 MMHG | RESPIRATION RATE: 15 BRPM | SYSTOLIC BLOOD PRESSURE: 122 MMHG | HEART RATE: 80 BPM

## 2023-07-18 DIAGNOSIS — Q21.0 VSD (VENTRICULAR SEPTAL DEFECT): Primary | ICD-10-CM

## 2023-07-18 PROCEDURE — 1036F TOBACCO NON-USER: CPT | Performed by: INTERNAL MEDICINE

## 2023-07-18 PROCEDURE — G8420 CALC BMI NORM PARAMETERS: HCPCS | Performed by: INTERNAL MEDICINE

## 2023-07-18 PROCEDURE — G8427 DOCREV CUR MEDS BY ELIG CLIN: HCPCS | Performed by: INTERNAL MEDICINE

## 2023-07-18 PROCEDURE — 93000 ELECTROCARDIOGRAM COMPLETE: CPT | Performed by: INTERNAL MEDICINE

## 2023-07-18 PROCEDURE — 99214 OFFICE O/P EST MOD 30 MIN: CPT | Performed by: INTERNAL MEDICINE

## 2023-07-18 RX ORDER — ATORVASTATIN CALCIUM 20 MG/1
20 TABLET, FILM COATED ORAL DAILY
COMMUNITY

## 2023-07-18 NOTE — PATIENT INSTRUCTIONS
Echocardiogram before next office visit in one year to be arranged  Continue current cardiac medications. Follow up in 1 year, sooner if needed.

## 2023-07-18 NOTE — PROGRESS NOTES
2023    Prema Parsons MD  [address]    RE: Nisreen Silva   : 1985     Dear Dr. Prema Parsons MD :    I had the pleasure of seeing your patient, Nisreen Silva in the office today on 2023. As you are well aware, Mr. Patrick Santamaria is a pleasant 42 y.o.  male with history of cerebral palsy who returns today for follow-up of history of VSD, status post repair in childhood. He was previously following with Dr. Sy Love, last seen 2022. His last echocardiogram in 2019 showed EF 60% but no VSD. He is a limited historian and presents today with his caretaker who supplements his history. They are unaware of where the surgery was performed. Currently, he reports feeling reasonably well. He denies any worsening dyspnea or chest pain. He is functionally limited and wheelchair dependent. No palpitations, near-syncope, or syncope. He has a chronic dry cough. He is compliant with medications. Current medications:   Current Outpatient Medications   Medication Sig Dispense Refill    atorvastatin (LIPITOR) 20 MG tablet Take 1 tablet by mouth daily      levETIRAcetam (KEPPRA) 1000 MG tablet Take 1 tablet by mouth in the morning and 1 tablet in the evening. 60 tablet 0    diphenhydrAMINE (BENADRYL) 25 MG capsule Take 1 capsule by mouth every 8 hours as needed Do Not Exceed 300 mg per day      Calcium Carbonate-Simethicone (MAALOX MAX PO) Take by mouth Two tablespoons every 4 hours prn for indigestion. guaiFENesin (ROBITUSSIN) 100 MG/5ML syrup Take 10 mLs by mouth every 4 hours as needed for Cough      Incontinent Wash (PERINEAL CLEANSING EX) Apply topically      Multiple Vitamins-Minerals (THERAPEUTIC MULTIVITAMIN-MINERALS) tablet Take 1 tablet by mouth daily 90 tablet 1    Cholecalciferol (VITAMIN D3) 50 MCG (2000 UT) TABS Take 1 tablet by mouth daily      ketoconazole (NIZORAL) 2 % cream Apply topically daily.  (Patient taking differently: Apply

## 2024-07-16 ENCOUNTER — HOSPITAL ENCOUNTER (OUTPATIENT)
Age: 39
Discharge: HOME | End: 2024-07-18
Payer: MEDICARE

## 2024-07-16 VITALS
SYSTOLIC BLOOD PRESSURE: 134 MMHG | BODY MASS INDEX: 24.11 KG/M2 | HEIGHT: 66 IN | DIASTOLIC BLOOD PRESSURE: 97 MMHG | WEIGHT: 150 LBS

## 2024-07-16 DIAGNOSIS — Q21.0 VSD (VENTRICULAR SEPTAL DEFECT): ICD-10-CM

## 2024-07-16 LAB
ECHO AV AREA PEAK VELOCITY: 2 CM2
ECHO AV AREA PEAK VELOCITY: 2 CM2
ECHO AV AREA PEAK VELOCITY: 2.1 CM2
ECHO AV AREA PEAK VELOCITY: 2.1 CM2
ECHO AV AREA VTI: 2.5 CM2
ECHO AV AREA/BSA VTI: 1.4 CM2/M2
ECHO AV CUSP MM: 1.4 CM
ECHO AV MEAN GRADIENT: 2 MMHG
ECHO AV MEAN VELOCITY: 0.7 M/S
ECHO AV PEAK GRADIENT: 5 MMHG
ECHO AV PEAK GRADIENT: 5 MMHG
ECHO AV PEAK VELOCITY: 1.1 M/S
ECHO AV PEAK VELOCITY: 1.1 M/S
ECHO AV VTI: 15.3 CM
ECHO BSA: 1.78 M2
ECHO LA VOL A-L A2C: 8 ML (ref 18–58)
ECHO LA VOL A-L A4C: 14 ML (ref 18–58)
ECHO LA VOL MOD A2C: 8 ML (ref 18–58)
ECHO LA VOL MOD A4C: 13 ML (ref 18–58)
ECHO LA VOLUME AREA LENGTH: 12 ML
ECHO LA VOLUME INDEX A-L A2C: 5 ML/M2 (ref 16–34)
ECHO LA VOLUME INDEX A-L A4C: 8 ML/M2 (ref 16–34)
ECHO LA VOLUME INDEX AREA LENGTH: 7 ML/M2 (ref 16–34)
ECHO LA VOLUME INDEX MOD A2C: 5 ML/M2 (ref 16–34)
ECHO LA VOLUME INDEX MOD A4C: 7 ML/M2 (ref 16–34)
ECHO LV E' LATERAL VELOCITY: 10 CM/S
ECHO LV E' SEPTAL VELOCITY: 7 CM/S
ECHO LV EDV A2C: 16 ML
ECHO LV EDV A4C: 39 ML
ECHO LV EDV BP: 26 ML (ref 67–155)
ECHO LV EDV INDEX A4C: 22 ML/M2
ECHO LV EDV INDEX BP: 15 ML/M2
ECHO LV EDV NDEX A2C: 9 ML/M2
ECHO LV EJECTION FRACTION A2C: 57 %
ECHO LV EJECTION FRACTION A4C: 68 %
ECHO LV EJECTION FRACTION BIPLANE: 65 % (ref 55–100)
ECHO LV ESV A2C: 7 ML
ECHO LV ESV A4C: 12 ML
ECHO LV ESV BP: 9 ML (ref 22–58)
ECHO LV ESV INDEX A2C: 4 ML/M2
ECHO LV ESV INDEX A4C: 7 ML/M2
ECHO LV ESV INDEX BP: 5 ML/M2
ECHO LV FRACTIONAL SHORTENING: 31 % (ref 28–44)
ECHO LV INTERNAL DIMENSION DIASTOLE INDEX: 1.98 CM/M2
ECHO LV INTERNAL DIMENSION DIASTOLIC: 3.5 CM (ref 4.2–5.9)
ECHO LV INTERNAL DIMENSION SYSTOLIC INDEX: 1.36 CM/M2
ECHO LV INTERNAL DIMENSION SYSTOLIC: 2.4 CM
ECHO LV IVSD: 0.9 CM (ref 0.6–1)
ECHO LV IVSS: 1.2 CM
ECHO LV MASS 2D: 103.4 G (ref 88–224)
ECHO LV MASS INDEX 2D: 58.4 G/M2 (ref 49–115)
ECHO LV POSTERIOR WALL DIASTOLIC: 1.1 CM (ref 0.6–1)
ECHO LV POSTERIOR WALL SYSTOLIC: 1.2 CM
ECHO LV RELATIVE WALL THICKNESS RATIO: 0.63
ECHO LVOT AREA: 3.8 CM2
ECHO LVOT AV VTI INDEX: 0.69
ECHO LVOT DIAM: 2.2 CM
ECHO LVOT MEAN GRADIENT: 1 MMHG
ECHO LVOT PEAK GRADIENT: 2 MMHG
ECHO LVOT PEAK GRADIENT: 2 MMHG
ECHO LVOT PEAK VELOCITY: 0.6 M/S
ECHO LVOT PEAK VELOCITY: 0.6 M/S
ECHO LVOT STROKE VOLUME INDEX: 22.8 ML/M2
ECHO LVOT SV: 40.3 ML
ECHO LVOT VTI: 10.6 CM
ECHO MV A VELOCITY: 0.44 M/S
ECHO MV AREA PHT: 3.8 CM2
ECHO MV E DECELERATION TIME (DT): 272.6 MS
ECHO MV E VELOCITY: 0.52 M/S
ECHO MV E/A RATIO: 1.18
ECHO MV E/E' LATERAL: 5.2
ECHO MV E/E' RATIO (AVERAGED): 6.31
ECHO MV E/E' SEPTAL: 7.43
ECHO MV PRESSURE HALF TIME (PHT): 58.5 MS
ECHO PV MAX VELOCITY: 1.1 M/S
ECHO PV PEAK GRADIENT: 5 MMHG
ECHO RV INTERNAL DIMENSION: 1.1 CM
ECHO RVOT PEAK GRADIENT: 3 MMHG
ECHO RVOT PEAK VELOCITY: 0.8 M/S
ECHO TV REGURGITANT MAX VELOCITY: 2.38 M/S
ECHO TV REGURGITANT PEAK GRADIENT: 23 MMHG

## 2024-07-16 PROCEDURE — 93306 TTE W/DOPPLER COMPLETE: CPT

## 2024-07-16 PROCEDURE — 93306 TTE W/DOPPLER COMPLETE: CPT | Performed by: INTERNAL MEDICINE

## 2024-08-06 ENCOUNTER — OFFICE VISIT (OUTPATIENT)
Dept: CARDIOLOGY CLINIC | Age: 39
End: 2024-08-06
Payer: MEDICARE

## 2024-08-06 VITALS
DIASTOLIC BLOOD PRESSURE: 80 MMHG | RESPIRATION RATE: 16 BRPM | HEART RATE: 69 BPM | OXYGEN SATURATION: 97 % | SYSTOLIC BLOOD PRESSURE: 122 MMHG

## 2024-08-06 DIAGNOSIS — Q21.0 VSD (VENTRICULAR SEPTAL DEFECT): Primary | ICD-10-CM

## 2024-08-06 PROCEDURE — 1036F TOBACCO NON-USER: CPT | Performed by: INTERNAL MEDICINE

## 2024-08-06 PROCEDURE — G8428 CUR MEDS NOT DOCUMENT: HCPCS | Performed by: INTERNAL MEDICINE

## 2024-08-06 PROCEDURE — G8420 CALC BMI NORM PARAMETERS: HCPCS | Performed by: INTERNAL MEDICINE

## 2024-08-06 PROCEDURE — 99214 OFFICE O/P EST MOD 30 MIN: CPT | Performed by: INTERNAL MEDICINE

## 2024-08-06 PROCEDURE — 93000 ELECTROCARDIOGRAM COMPLETE: CPT | Performed by: INTERNAL MEDICINE

## 2024-08-06 NOTE — PROGRESS NOTES
accessory muscle usage. Heart is a regular rate and rhythm.  Soft diastolic murmur at base. No S3 or S4. PMI is nondisplaced. Abdomen is soft and non tender.  No hepatosplenomegaly. No abdominal aortic bruits or masses. Lower extremities with trivial bilateral edema. No clubbing or cyanosis. Neurologically, cranial nerves are grossly intact. No focal sensory and/or motor deficits. Skin is intact without rash or lesions.       ECG (8/6/2024): Sinus rhythm.  Heart rate 68 bpm.  Incomplete RBBB.      Echocardiogram (2019): EF 60%.  No VSD.  Mild PI    Echocardiogram (7/16/2024) EF 55 to 60%.  History of VSD repair but no evidence of residual VSD.       IMPRESSION:  History of childhood VSD repair.  No residual VSD by most recent echocardiogram (7/16/2024).  Normal LV systolic function, EF 55 to 60%  Hyperlipidemia  History of cerebral palsy.      RECOMMENDATIONS:  Mr. Galvin appears to be stable from a cardiac standpoint.  No further cardiac testing needed at this time.  The best course of action is continued medical therapy and aggressive risk factor modification.  Continue current cardiac medications.  The patient is agreeable to the plan.  Follow up cardiac evaluation in 1 year, sooner if needed.       Thank you very much for allowing me to participate in Mr. Galvin's cardiac care. Should you have any questions, please do not hesitate to contact me.     Sincerely,    Deborah Mcgregor DO, FAC, On license of [facility]

## 2024-10-23 ENCOUNTER — HOSPITAL ENCOUNTER (OUTPATIENT)
Dept: PHYSICAL THERAPY | Age: 39
Setting detail: THERAPIES SERIES
Discharge: HOME | End: 2024-10-23
Payer: MEDICARE

## 2024-10-23 PROCEDURE — 97161 PT EVAL LOW COMPLEX 20 MIN: CPT

## 2024-10-23 NOTE — THERAPY EVALUATION
Physical Therapy Evaluation/Plan of Care   [facility]   [facility] [address]  Dept: [phone]  Dept Fax: [phone]  Loc: [phone]    Physical Therapy: Initial Evaluation    General Information    Patient: Hussein Galvin (38 y.o.     male)   Examination Date: 10/23/2024   :  1985 ;    Confirmed: Yes MRN: [id number]  CSN: [id number]   Insurance: Payor: MEDICARE / Plan: MEDICARE PART A AND B / Product Type: *No Product type* /   Insurance ID: [id number] - (Medicare)  PT Insurance Information: Medicare Secondary Insurance (if applicable): MEDICAID [place]    Referring Physician: Gabino Pedersen APRN - CNP       Visits to Date/Visits Approved: 1 /      No Show/Cancelled Appts: 0 / 0     Medical Diagnosis: Cerebral palsy, unspecified [G80.9]        Treatment Diagnosis: Inability to ambulate      SUBJECTIVE:     Onset date: chronic       Subjective/ Mechanism of Injury: Pt is need of a new manual W/C as current W/C is older (>5 years).  Pt relies on others to push him 50% of the time.  Pt is able to propel himself in the house but outside others push him.  No history of pressure sores.  Is in the chair for ~14 hours.  No pain while sitting in the chair.  Has a chest harness and Rt lateral support to keep him upright - otherwise will flip the chair.  Has a Negin to stand him up and sit him down to transfer him in and out of the wheelchair.  Pt has not stood or walked in many years and has been dependent on his manual W/C for all mobility.         Recent Falls: no  Near Falls: no  Syncope: no      Interventions for current problem: None     Pain:   Current: 0/10       Imaging results (If Applicable): No results found.    Past Medical History  Past Medical History:   Diagnosis Date    Acute post-hemorrhagic anemia     Constipation     CP (cerebral palsy) (HCC)     Development delay     Epilepsy (HCC)     Hip fracture (HCC)